# Patient Record
Sex: FEMALE | Race: WHITE | NOT HISPANIC OR LATINO | Employment: FULL TIME | RURAL
[De-identification: names, ages, dates, MRNs, and addresses within clinical notes are randomized per-mention and may not be internally consistent; named-entity substitution may affect disease eponyms.]

---

## 2021-08-03 ENCOUNTER — HOSPITAL ENCOUNTER (EMERGENCY)
Facility: HOSPITAL | Age: 61
Discharge: HOME | End: 2021-08-03
Payer: OTHER MISCELLANEOUS

## 2021-08-03 VITALS
BODY MASS INDEX: 25.69 KG/M2 | OXYGEN SATURATION: 98 % | TEMPERATURE: 98 F | SYSTOLIC BLOOD PRESSURE: 154 MMHG | WEIGHT: 145 LBS | HEART RATE: 69 BPM | DIASTOLIC BLOOD PRESSURE: 75 MMHG | RESPIRATION RATE: 18 BRPM | HEIGHT: 63 IN

## 2021-08-03 DIAGNOSIS — S60.221A CONTUSION OF RIGHT HAND, INITIAL ENCOUNTER: Primary | ICD-10-CM

## 2021-08-03 DIAGNOSIS — S69.91XA HAND INJURY, RIGHT, INITIAL ENCOUNTER: ICD-10-CM

## 2021-08-03 PROCEDURE — 99282 PR EMERGENCY DEPT VISIT,LEVEL II: ICD-10-PCS | Mod: ,,, | Performed by: NURSE PRACTITIONER

## 2021-08-03 PROCEDURE — 99282 EMERGENCY DEPT VISIT SF MDM: CPT | Mod: ,,, | Performed by: NURSE PRACTITIONER

## 2021-08-03 PROCEDURE — 99283 EMERGENCY DEPT VISIT LOW MDM: CPT

## 2021-08-03 RX ORDER — ASPIRIN 81 MG/1
81 TABLET ORAL DAILY
COMMUNITY
End: 2023-06-06

## 2021-08-03 RX ORDER — LANSOPRAZOLE 30 MG/1
30 CAPSULE, DELAYED RELEASE ORAL DAILY
COMMUNITY

## 2021-08-03 RX ORDER — GLIMEPIRIDE 2 MG/1
2 TABLET ORAL
COMMUNITY
End: 2023-10-27 | Stop reason: CLARIF

## 2021-08-03 RX ORDER — METFORMIN HYDROCHLORIDE 500 MG/1
500 TABLET ORAL 2 TIMES DAILY WITH MEALS
COMMUNITY
End: 2023-10-27 | Stop reason: CLARIF

## 2021-08-03 RX ORDER — GABAPENTIN 800 MG/1
900 TABLET ORAL 4 TIMES DAILY
COMMUNITY
End: 2022-08-31

## 2021-08-03 RX ORDER — ATORVASTATIN CALCIUM 40 MG/1
80 TABLET, FILM COATED ORAL DAILY
COMMUNITY

## 2021-08-03 RX ORDER — CLOPIDOGREL BISULFATE 75 MG/1
75 TABLET ORAL DAILY
COMMUNITY
End: 2022-08-31 | Stop reason: SDUPTHER

## 2021-08-03 RX ORDER — TRAMADOL HYDROCHLORIDE 50 MG/1
50 TABLET ORAL EVERY 8 HOURS PRN
COMMUNITY

## 2021-08-03 RX ORDER — AMLODIPINE BESYLATE 10 MG/1
10 TABLET ORAL DAILY
COMMUNITY

## 2021-08-08 ENCOUNTER — TELEPHONE (OUTPATIENT)
Dept: EMERGENCY MEDICINE | Facility: HOSPITAL | Age: 61
End: 2021-08-08

## 2021-08-12 ENCOUNTER — OFFICE VISIT (OUTPATIENT)
Dept: OTOLARYNGOLOGY | Facility: CLINIC | Age: 61
End: 2021-08-12
Payer: COMMERCIAL

## 2021-08-12 VITALS — HEIGHT: 63 IN | BODY MASS INDEX: 25.69 KG/M2 | WEIGHT: 145 LBS

## 2021-08-12 DIAGNOSIS — H65.23 CHRONIC SEROUS OTITIS MEDIA OF BOTH EARS: Primary | ICD-10-CM

## 2021-08-12 DIAGNOSIS — H69.90 DYSFUNCTION OF EUSTACHIAN TUBE, UNSPECIFIED LATERALITY: ICD-10-CM

## 2021-08-12 PROCEDURE — 99204 PR OFFICE/OUTPT VISIT, NEW, LEVL IV, 45-59 MIN: ICD-10-PCS | Mod: S$PBB,,, | Performed by: OTOLARYNGOLOGY

## 2021-08-12 PROCEDURE — 99204 OFFICE O/P NEW MOD 45 MIN: CPT | Mod: S$PBB,,, | Performed by: OTOLARYNGOLOGY

## 2021-08-12 PROCEDURE — 99213 OFFICE O/P EST LOW 20 MIN: CPT | Mod: PBBFAC | Performed by: OTOLARYNGOLOGY

## 2021-08-12 RX ORDER — AMOXICILLIN AND CLAVULANATE POTASSIUM 875; 125 MG/1; MG/1
1 TABLET, FILM COATED ORAL EVERY 12 HOURS
Qty: 20 TABLET | Refills: 0 | Status: SHIPPED | OUTPATIENT
Start: 2021-08-12 | End: 2021-08-22

## 2021-08-12 RX ORDER — TRIAMCINOLONE ACETONIDE 55 UG/1
2 SPRAY, METERED NASAL DAILY
Qty: 16.9 ML | Refills: 0 | Status: SHIPPED | OUTPATIENT
Start: 2021-08-12 | End: 2023-10-27 | Stop reason: CLARIF

## 2022-08-31 ENCOUNTER — OFFICE VISIT (OUTPATIENT)
Dept: NEUROLOGY | Facility: CLINIC | Age: 62
End: 2022-08-31
Payer: COMMERCIAL

## 2022-08-31 VITALS
RESPIRATION RATE: 18 BRPM | BODY MASS INDEX: 25.16 KG/M2 | HEIGHT: 63 IN | HEART RATE: 75 BPM | SYSTOLIC BLOOD PRESSURE: 138 MMHG | WEIGHT: 142 LBS | DIASTOLIC BLOOD PRESSURE: 70 MMHG | OXYGEN SATURATION: 95 %

## 2022-08-31 DIAGNOSIS — E10.42 DIABETIC POLYNEUROPATHY ASSOCIATED WITH TYPE 1 DIABETES MELLITUS: ICD-10-CM

## 2022-08-31 DIAGNOSIS — I63.9 CEREBROVASCULAR ACCIDENT (CVA), UNSPECIFIED MECHANISM: Primary | ICD-10-CM

## 2022-08-31 PROCEDURE — 99204 OFFICE O/P NEW MOD 45 MIN: CPT | Mod: S$PBB,,, | Performed by: NURSE PRACTITIONER

## 2022-08-31 PROCEDURE — 3078F DIAST BP <80 MM HG: CPT | Mod: ,,, | Performed by: NURSE PRACTITIONER

## 2022-08-31 PROCEDURE — 1160F RVW MEDS BY RX/DR IN RCRD: CPT | Mod: ,,, | Performed by: NURSE PRACTITIONER

## 2022-08-31 PROCEDURE — 1160F PR REVIEW ALL MEDS BY PRESCRIBER/CLIN PHARMACIST DOCUMENTED: ICD-10-PCS | Mod: ,,, | Performed by: NURSE PRACTITIONER

## 2022-08-31 PROCEDURE — 3078F PR MOST RECENT DIASTOLIC BLOOD PRESSURE < 80 MM HG: ICD-10-PCS | Mod: ,,, | Performed by: NURSE PRACTITIONER

## 2022-08-31 PROCEDURE — 99214 OFFICE O/P EST MOD 30 MIN: CPT | Mod: PBBFAC | Performed by: NURSE PRACTITIONER

## 2022-08-31 PROCEDURE — 1159F PR MEDICATION LIST DOCUMENTED IN MEDICAL RECORD: ICD-10-PCS | Mod: ,,, | Performed by: NURSE PRACTITIONER

## 2022-08-31 PROCEDURE — 3008F BODY MASS INDEX DOCD: CPT | Mod: ,,, | Performed by: NURSE PRACTITIONER

## 2022-08-31 PROCEDURE — 3075F SYST BP GE 130 - 139MM HG: CPT | Mod: ,,, | Performed by: NURSE PRACTITIONER

## 2022-08-31 PROCEDURE — 99204 PR OFFICE/OUTPT VISIT, NEW, LEVL IV, 45-59 MIN: ICD-10-PCS | Mod: S$PBB,,, | Performed by: NURSE PRACTITIONER

## 2022-08-31 PROCEDURE — 1159F MED LIST DOCD IN RCRD: CPT | Mod: ,,, | Performed by: NURSE PRACTITIONER

## 2022-08-31 PROCEDURE — 3075F PR MOST RECENT SYSTOLIC BLOOD PRESS GE 130-139MM HG: ICD-10-PCS | Mod: ,,, | Performed by: NURSE PRACTITIONER

## 2022-08-31 PROCEDURE — 3008F PR BODY MASS INDEX (BMI) DOCUMENTED: ICD-10-PCS | Mod: ,,, | Performed by: NURSE PRACTITIONER

## 2022-08-31 RX ORDER — GABAPENTIN 600 MG/1
900 TABLET ORAL 4 TIMES DAILY
Qty: 180 TABLET | Refills: 5 | Status: SHIPPED | OUTPATIENT
Start: 2022-08-31 | End: 2023-10-05 | Stop reason: SDUPTHER

## 2022-08-31 RX ORDER — CLOPIDOGREL BISULFATE 75 MG/1
75 TABLET ORAL DAILY
Qty: 30 TABLET | Refills: 5 | Status: SHIPPED | OUTPATIENT
Start: 2022-08-31 | End: 2023-01-09 | Stop reason: SDUPTHER

## 2022-08-31 NOTE — PROGRESS NOTES
Subjective:       Patient ID: Alina Resendez is a 61 y.o. female     Chief Complaint:    Chief Complaint   Patient presents with    Referral    Stroke        Allergies:  Patient has no known allergies.    Current Medications:    Outpatient Encounter Medications as of 8/31/2022   Medication Sig Dispense Refill    amLODIPine (NORVASC) 10 MG tablet Take 10 mg by mouth once daily.      aspirin (ECOTRIN) 81 MG EC tablet Take 81 mg by mouth once daily.      atorvastatin (LIPITOR) 40 MG tablet Take 80 mg by mouth once daily.      glimepiride (AMARYL) 2 MG tablet Take 2 mg by mouth before breakfast.      insulin detemir U-100 (LEVEMIR) 100 unit/mL injection Inject 70 Units into the skin every evening.       lansoprazole (PREVACID) 30 MG capsule Take 30 mg by mouth once daily.      traMADoL (ULTRAM) 50 mg tablet Take 50 mg by mouth every 8 (eight) hours as needed.       triamcinolone (NASACORT) 55 mcg nasal inhaler 2 sprays by Nasal route once daily. 16.9 mL 0    [DISCONTINUED] clopidogreL (PLAVIX) 75 mg tablet Take 75 mg by mouth once daily.      [DISCONTINUED] gabapentin (NEURONTIN) 800 MG tablet Take 900 mg by mouth 4 (four) times daily.      clopidogreL (PLAVIX) 75 mg tablet Take 1 tablet (75 mg total) by mouth once daily. 30 tablet 5    gabapentin (NEURONTIN) 600 MG tablet Take 1.5 tablets (900 mg total) by mouth 4 (four) times daily. 180 tablet 5    metFORMIN (GLUCOPHAGE) 500 MG tablet Take 500 mg by mouth 2 (two) times daily with meals.       No facility-administered encounter medications on file as of 8/31/2022.       History of Present Illness  62 y/o female new referral to neurology for reported history of CVA, restless leg syndrome, diabetic polyneuropathy    She formerly followed by Dr. Chen at [facility]    Records indicate CVA was in May of 2021 with reported right hemiparesis and mild speech delay.  Also reported is continued on plavix 75mg and ASA 81mg daily.  She reports prior TIA of at least 2, and  "2 CVAs possibly.  EMG/NCS of the BLE done by Dr. Chen in March of 2022 did not report a radiculopathy nor a polyneuropathy.  MRI 4/2022 copy sent with records shows bilateral hemisphere old lacunar infarcts.  She says the last CVA in May of 2021 caused her right hemiparesis which has improved, she has slight weakness on the RUE and RLE, but she has more difficulty with her speech, at times her speech will "run together" and she has to stop and start over.  I do note this several times during the visit.  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  Has some difficulty with holding items in bilateral hands, has prior history of bilateral carpal tunnel release 7-8 years ago.  She says at the time of her last stroke they had actually held her plavix for 5 days so she could have an upper GI scope and this is when the CVA happened, so not plavix failure.  They kept her on ASA and plavix since that time.  I do recommend she can stop the aspirin, she does not need to be on duel therapy.  She continues on lipitor and norvasc.  She declines PT/ST, says she is too busy to be able to do it, but if she decides to do so can schedule this at [place].    Diabetic polyneuropathy, prescribed neurontin 900mg QID, so maximum dosing.  Has been on this dosing for few years, denies too much complication and says symptoms of burning in her feet is much worse if she misses a dose.  IN past 1200mg on Tid dosing made her too sleepy, thus the 900mg Qid.    Prior neuropsyche workup for reported memory impairment resulted primarily changes from prior CVA, but also likely contributing is the high dose neurontin         Review of Systems  Review of Systems   Constitutional:  Negative for diaphoresis and fever.   HENT:  Negative for congestion, hearing loss and tinnitus.    Eyes:  Negative for blurred vision, double vision, photophobia, discharge and redness.   Respiratory:  Negative for cough and shortness of breath.    Cardiovascular:  Negative " for chest pain.   Gastrointestinal:  Negative for abdominal pain, nausea and vomiting.   Musculoskeletal:  Negative for back pain, falls, joint pain, myalgias and neck pain.   Skin:  Negative for itching and rash.   Neurological:  Positive for tingling, sensory change, speech change and weakness. Negative for dizziness, tremors, focal weakness, seizures, loss of consciousness and headaches.   Psychiatric/Behavioral:  Positive for memory loss. Negative for depression and hallucinations. The patient does not have insomnia.    All other systems reviewed and are negative.   Objective:     NEUROLOGICAL EXAMINATION:     MENTAL STATUS   Oriented to person, place, and time.   Registration: recalls 3 of 3 objects. Recall at 5 minutes: recalls 3 of 3 objects.   Attention: normal. Concentration: normal.   Speech: speech is normal   Level of consciousness: alert  Knowledge: good and consistent with education.   Normal comprehension.     CRANIAL NERVES     CN II   Visual fields full to confrontation.   Visual acuity: normal  Right visual field deficit: none  Left visual field deficit: none     CN III, IV, VI   Pupils are equal, round, and reactive to light.  Extraocular motions are normal.   Right pupil: Size: 3 mm. Shape: regular. Reactivity: brisk. Consensual response: intact. Accommodation: intact.   Left pupil: Size: 3 mm. Shape: regular. Reactivity: brisk. Consensual response: intact. Accommodation: intact.   CN III: no CN III palsy  CN VI: no CN VI palsy  Nystagmus: none   Diplopia: none  Upgaze: normal  Downgaze: normal  Conjugate gaze: present  Vestibulo-ocular reflex: present    CN V   Facial sensation intact.   Right facial sensation deficit: none  Left facial sensation deficit: none  Right corneal reflex: normal  Left corneal reflex: normal    CN VII   Facial expression full, symmetric.   Right facial weakness: none  Left facial weakness: none  Right taste: normal  Left taste: normal    CN VIII   CN VIII normal.    Hearing: intact    CN IX, X   CN IX normal.   CN X normal.   Palate: symmetric    CN XI   CN XI normal.   Right sternocleidomastoid strength: normal  Left sternocleidomastoid strength: normal  Right trapezius strength: normal  Left trapezius strength: normal    CN XII   CN XII normal.   Tongue: not atrophic  Fasciculations: absent  Tongue deviation: none    MOTOR EXAM   Muscle bulk: normal  Overall muscle tone: normal  Right arm tone: normal  Left arm tone: normal  Right arm pronator drift: absent  Left arm pronator drift: absent  Right leg tone: normal  Left leg tone: normal    Strength   Right neck flexion: 5/5  Left neck flexion: 5/5  Right neck extension: 5/5  Left neck extension: 5/5  Right deltoid: 5/5  Left deltoid: 5/5  Right biceps: 4/5  Left biceps: 5/5  Right triceps: 4/5  Left triceps: 5/5  Right wrist flexion: 5/5  Left wrist flexion: 5/5  Right wrist extension: 4/5  Left wrist extension: 5/5  Right interossei: 4/5  Left interossei: 5/5  Right iliopsoas: 5/5  Left iliopsoas: 5/5  Right quadriceps: 5/5  Left quadriceps: 5/5  Right hamstrin/5  Left hamstrin/5  Right anterior tibial: 5/5  Left anterior tibial: 5/5  Right posterior tibial: 5/5  Left posterior tibial: 5/5  Right gastroc: 5/5  Left gastroc: 5/5    REFLEXES     Reflexes   Right brachioradialis: 2+  Left brachioradialis: 2+  Right biceps: 2+  Left biceps: 2+  Right triceps: 2+  Left triceps: 2+  Right patellar: 2+  Left patellar: 2+  Right achilles: 2+  Left achilles: 2+  Right plantar: normal  Left plantar: normal  Right Jerry: absent  Left Jerry: absent  Right ankle clonus: absent  Left ankle clonus: absent  Right pendular knee jerk: absent  Left pendular knee jerk: absent    SENSORY EXAM   Right arm light touch: decreased from fingers  Left arm light touch: decreased from fingers  Right leg light touch: normal  Left leg light touch: normal  Right arm vibration: decreased from fingers  Left arm vibration: decreased from  fingers  Right leg vibration: normal  Left leg vibration: normal  Right arm proprioception: decreased from fingers  Left arm proprioception: decreased from fingers  Right leg proprioception: normal  Left leg proprioception: normal  Right arm pinprick: decreased from fingers  Left arm pinprick: decreased from fingers  Right leg pinprick: normal  Left leg pinprick: normal    GAIT AND COORDINATION     Gait  Gait: wide-based     Coordination   Finger to nose coordination: normal  Heel to shin coordination: normal  Tandem walking coordination: abnormal    Tremor   Resting tremor: absent  Intention tremor: absent  Action tremor: absent     Physical Exam  Vitals and nursing note reviewed.   Constitutional:       Appearance: Normal appearance.   HENT:      Head: Normocephalic.   Eyes:      Extraocular Movements: Extraocular movements intact and EOM normal.      Pupils: Pupils are equal, round, and reactive to light.   Cardiovascular:      Rate and Rhythm: Normal rate and regular rhythm.   Pulmonary:      Effort: Pulmonary effort is normal.      Breath sounds: Normal breath sounds.   Musculoskeletal:         General: No swelling or tenderness. Normal range of motion.      Cervical back: Normal range of motion and neck supple.      Right lower leg: No edema.      Left lower leg: No edema.   Skin:     General: Skin is warm and dry.      Coloration: Skin is not jaundiced.      Findings: No rash.   Neurological:      General: No focal deficit present.      Mental Status: She is alert and oriented to person, place, and time. Mental status is at baseline.      GCS: GCS eye subscore is 4. GCS verbal subscore is 5. GCS motor subscore is 6.      Cranial Nerves: No cranial nerve deficit.      Sensory: Sensory deficit present.      Motor: Weakness present.      Coordination: Coordination is intact. Coordination normal. Finger-Nose-Finger Test and Heel to Shin Test normal.      Gait: Tandem walk abnormal. Gait normal.      Deep Tendon  Reflexes: Reflexes normal.      Reflex Scores:       Tricep reflexes are 2+ on the right side and 2+ on the left side.       Bicep reflexes are 2+ on the right side and 2+ on the left side.       Brachioradialis reflexes are 2+ on the right side and 2+ on the left side.       Patellar reflexes are 2+ on the right side and 2+ on the left side.       Achilles reflexes are 2+ on the right side and 2+ on the left side.  Psychiatric:         Mood and Affect: Mood normal.         Speech: Speech normal.         Behavior: Behavior normal.        Assessment:     Problem List Items Addressed This Visit          Neuro    Cerebrovascular accident (CVA) - Primary    Relevant Medications    clopidogreL (PLAVIX) 75 mg tablet    Diabetic polyneuropathy associated with type 1 diabetes mellitus    Relevant Medications    gabapentin (NEURONTIN) 600 MG tablet        Primary Diagnosis and ICD10  Cerebrovascular accident (CVA), unspecified mechanism [I63.9]    Plan:     Patient Instructions   Can do physical therapy at any time patient feels she needs  Stop the aspirin daily  Continue the plavix 75mg daily  Continue the lipitor and blood pressure medication  Encouraged good eating and sleep habits  Good glycemic control  Continue the neurontin 900mg Qid    Medications Discontinued During This Encounter   Medication Reason    gabapentin (NEURONTIN) 800 MG tablet     clopidogreL (PLAVIX) 75 mg tablet Reorder       Requested Prescriptions     Signed Prescriptions Disp Refills    clopidogreL (PLAVIX) 75 mg tablet 30 tablet 5     Sig: Take 1 tablet (75 mg total) by mouth once daily.    gabapentin (NEURONTIN) 600 MG tablet 180 tablet 5     Sig: Take 1.5 tablets (900 mg total) by mouth 4 (four) times daily.       No orders of the defined types were placed in this encounter.

## 2022-08-31 NOTE — PATIENT INSTRUCTIONS
Can do physical therapy at any time patient feels she needs  Stop the aspirin daily  Continue the plavix 75mg daily  Continue the lipitor and blood pressure medication  Encouraged good eating and sleep habits  Good glycemic control  Continue the neurontin 900mg Qid

## 2022-12-05 PROBLEM — I63.9 CEREBROVASCULAR ACCIDENT (CVA): Status: RESOLVED | Noted: 2022-08-31 | Resolved: 2022-12-05

## 2023-01-09 DIAGNOSIS — I63.9 CEREBROVASCULAR ACCIDENT (CVA), UNSPECIFIED MECHANISM: ICD-10-CM

## 2023-01-09 RX ORDER — CLOPIDOGREL BISULFATE 75 MG/1
75 TABLET ORAL DAILY
Qty: 30 TABLET | Refills: 5 | Status: SHIPPED | OUTPATIENT
Start: 2023-01-09 | End: 2023-07-06 | Stop reason: SDUPTHER

## 2023-02-28 ENCOUNTER — OFFICE VISIT (OUTPATIENT)
Dept: NEUROLOGY | Facility: CLINIC | Age: 63
End: 2023-02-28
Payer: COMMERCIAL

## 2023-02-28 VITALS
HEART RATE: 78 BPM | OXYGEN SATURATION: 99 % | DIASTOLIC BLOOD PRESSURE: 80 MMHG | HEIGHT: 63 IN | RESPIRATION RATE: 18 BRPM | BODY MASS INDEX: 25.16 KG/M2 | SYSTOLIC BLOOD PRESSURE: 134 MMHG | WEIGHT: 142 LBS

## 2023-02-28 DIAGNOSIS — G31.84 MCI (MILD COGNITIVE IMPAIRMENT): ICD-10-CM

## 2023-02-28 DIAGNOSIS — G56.03 CARPAL TUNNEL SYNDROME ON BOTH SIDES: ICD-10-CM

## 2023-02-28 DIAGNOSIS — I63.9 CEREBROVASCULAR ACCIDENT (CVA), UNSPECIFIED MECHANISM: ICD-10-CM

## 2023-02-28 DIAGNOSIS — I69.398 DEPRESSION AS LATE EFFECT OF CEREBROVASCULAR ACCIDENT (CVA): ICD-10-CM

## 2023-02-28 DIAGNOSIS — E10.42 DIABETIC POLYNEUROPATHY ASSOCIATED WITH TYPE 1 DIABETES MELLITUS: Primary | ICD-10-CM

## 2023-02-28 DIAGNOSIS — F06.31 DEPRESSION AS LATE EFFECT OF CEREBROVASCULAR ACCIDENT (CVA): ICD-10-CM

## 2023-02-28 PROCEDURE — 1159F PR MEDICATION LIST DOCUMENTED IN MEDICAL RECORD: ICD-10-PCS | Mod: ,,, | Performed by: NURSE PRACTITIONER

## 2023-02-28 PROCEDURE — 1160F RVW MEDS BY RX/DR IN RCRD: CPT | Mod: ,,, | Performed by: NURSE PRACTITIONER

## 2023-02-28 PROCEDURE — 99213 PR OFFICE/OUTPT VISIT, EST, LEVL III, 20-29 MIN: ICD-10-PCS | Mod: S$PBB,,, | Performed by: NURSE PRACTITIONER

## 2023-02-28 PROCEDURE — 99213 OFFICE O/P EST LOW 20 MIN: CPT | Mod: S$PBB,,, | Performed by: NURSE PRACTITIONER

## 2023-02-28 PROCEDURE — 3079F DIAST BP 80-89 MM HG: CPT | Mod: ,,, | Performed by: NURSE PRACTITIONER

## 2023-02-28 PROCEDURE — 3008F BODY MASS INDEX DOCD: CPT | Mod: ,,, | Performed by: NURSE PRACTITIONER

## 2023-02-28 PROCEDURE — 1159F MED LIST DOCD IN RCRD: CPT | Mod: ,,, | Performed by: NURSE PRACTITIONER

## 2023-02-28 PROCEDURE — 3079F PR MOST RECENT DIASTOLIC BLOOD PRESSURE 80-89 MM HG: ICD-10-PCS | Mod: ,,, | Performed by: NURSE PRACTITIONER

## 2023-02-28 PROCEDURE — 1160F PR REVIEW ALL MEDS BY PRESCRIBER/CLIN PHARMACIST DOCUMENTED: ICD-10-PCS | Mod: ,,, | Performed by: NURSE PRACTITIONER

## 2023-02-28 PROCEDURE — 99214 OFFICE O/P EST MOD 30 MIN: CPT | Mod: PBBFAC | Performed by: NURSE PRACTITIONER

## 2023-02-28 PROCEDURE — 3075F PR MOST RECENT SYSTOLIC BLOOD PRESS GE 130-139MM HG: ICD-10-PCS | Mod: ,,, | Performed by: NURSE PRACTITIONER

## 2023-02-28 PROCEDURE — 3075F SYST BP GE 130 - 139MM HG: CPT | Mod: ,,, | Performed by: NURSE PRACTITIONER

## 2023-02-28 PROCEDURE — 3008F PR BODY MASS INDEX (BMI) DOCUMENTED: ICD-10-PCS | Mod: ,,, | Performed by: NURSE PRACTITIONER

## 2023-02-28 RX ORDER — CITALOPRAM 10 MG/1
10 TABLET ORAL DAILY
Qty: 30 TABLET | Refills: 11 | Status: SHIPPED | OUTPATIENT
Start: 2023-02-28 | End: 2024-03-12 | Stop reason: SDUPTHER

## 2023-02-28 NOTE — PROGRESS NOTES
Subjective:       Patient ID: Alina Resendez is a 62 y.o. female     Chief Complaint:    Chief Complaint   Patient presents with    Follow-up    Stroke        Allergies:  Patient has no known allergies.    Current Medications:    Outpatient Encounter Medications as of 2/28/2023   Medication Sig Dispense Refill    amLODIPine (NORVASC) 10 MG tablet Take 10 mg by mouth once daily.      atorvastatin (LIPITOR) 40 MG tablet Take 80 mg by mouth once daily.      clopidogreL (PLAVIX) 75 mg tablet Take 1 tablet (75 mg total) by mouth once daily. 30 tablet 5    gabapentin (NEURONTIN) 600 MG tablet Take 1.5 tablets (900 mg total) by mouth 4 (four) times daily. 180 tablet 5    insulin detemir U-100 (LEVEMIR) 100 unit/mL injection Inject 70 Units into the skin every evening.       lansoprazole (PREVACID) 30 MG capsule Take 30 mg by mouth once daily.      traMADoL (ULTRAM) 50 mg tablet Take 50 mg by mouth every 8 (eight) hours as needed.       triamcinolone (NASACORT) 55 mcg nasal inhaler 2 sprays by Nasal route once daily. 16.9 mL 0    aspirin (ECOTRIN) 81 MG EC tablet Take 81 mg by mouth once daily.      citalopram (CELEXA) 10 MG tablet Take 1 tablet (10 mg total) by mouth once daily. 30 tablet 11    glimepiride (AMARYL) 2 MG tablet Take 2 mg by mouth before breakfast.      metFORMIN (GLUCOPHAGE) 500 MG tablet Take 500 mg by mouth 2 (two) times daily with meals.       No facility-administered encounter medications on file as of 2/28/2023.       History of Present Illness  63 y/o female following in neurology for reported history of CVA, restless leg syndrome, diabetic polyneuropathy    She formerly followed by Dr. Chen at [facility]    Records indicate CVA was in May of 2021 with reported right hemiparesis and mild speech delay.  Also reported is continued on plavix 75mg and ASA 81mg daily.  She reports prior TIA of at least 2, and 2 CVAs possibly.  She says at the time of her last stroke they had actually held her  "plavix for 5 days so she could have an upper GI scope and this is when the CVA happened, so not plavix failure.  They kept her on ASA and plavix since that time.  I did recommend she can stop the aspirin at last visit, she does not need to be on duel therapy.  She continues on lipitor and norvasc.  She declines PT/ST, says she is too busy to be able to do it, but if she decides to do so can schedule this at [place].    She has history of of carpal tunnel with bilateral release 10 years prior with continued bilateral hand weakness    EMG/NCS of the BLE done by Dr. Chen in March of 2022 did not report a radiculopathy nor a polyneuropathy.  MRI 4/2022 copy sent with records shows bilateral hemisphere old lacunar infarcts.  She says the last CVA in May of 2021 caused her right hemiparesis which has improved, she has slight weakness on the RUE and RLE, but she has more difficulty with her speech, at times her speech will "run together" and she has to stop and start over.  I do note this several times during the visit.  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  Has some difficulty with holding items in bilateral hands, has prior history of bilateral carpal tunnel release 7-8 years ago.      Diabetic polyneuropathy, prescribed neurontin 900mg QID, so maximum dosing.  Has been on this dosing for few years, denies too much complication and says symptoms of burning in her feet is much worse if she misses a dose.  IN past 1200mg on Tid dosing made her too sleepy, thus the 900mg Qid.    Prior neuropsyche workup for reported memory impairment resulted primarily changes from prior CVA, but also likely contributing is the high dose neurontin    She reports increased agitation at times, since the CVA.  I discussed option of celexa and she is open to trying this.         Review of Systems  Review of Systems   Constitutional:  Negative for diaphoresis and fever.   HENT:  Negative for congestion, hearing loss and tinnitus.  "   Eyes:  Negative for blurred vision, double vision, photophobia, discharge and redness.   Respiratory:  Negative for cough and shortness of breath.    Cardiovascular:  Negative for chest pain.   Gastrointestinal:  Negative for abdominal pain, nausea and vomiting.   Musculoskeletal:  Negative for back pain, falls, joint pain, myalgias and neck pain.   Skin:  Negative for itching and rash.   Neurological:  Positive for tingling, sensory change, speech change and weakness. Negative for dizziness, tremors, focal weakness, seizures, loss of consciousness and headaches.   Psychiatric/Behavioral:  Positive for depression and memory loss. Negative for hallucinations. The patient does not have insomnia.    All other systems reviewed and are negative.   Objective:     NEUROLOGICAL EXAMINATION:     MENTAL STATUS   Oriented to person, place, and time.   Registration: recalls 3 of 3 objects. Recall at 5 minutes: recalls 3 of 3 objects.   Attention: normal. Concentration: normal.   Speech: speech is normal   Level of consciousness: alert  Knowledge: good and consistent with education.   Normal comprehension.     CRANIAL NERVES     CN II   Visual fields full to confrontation.   Visual acuity: normal  Right visual field deficit: none  Left visual field deficit: none     CN III, IV, VI   Pupils are equal, round, and reactive to light.  Extraocular motions are normal.   Right pupil: Size: 3 mm. Shape: regular. Reactivity: brisk. Consensual response: intact. Accommodation: intact.   Left pupil: Size: 3 mm. Shape: regular. Reactivity: brisk. Consensual response: intact. Accommodation: intact.   CN III: no CN III palsy  CN VI: no CN VI palsy  Nystagmus: none   Diplopia: none  Upgaze: normal  Downgaze: normal  Conjugate gaze: present  Vestibulo-ocular reflex: present    CN V   Facial sensation intact.   Right facial sensation deficit: none  Left facial sensation deficit: none  Right corneal reflex: normal  Left corneal reflex:  normal    CN VII   Facial expression full, symmetric.   Right facial weakness: none  Left facial weakness: none  Right taste: normal  Left taste: normal    CN VIII   CN VIII normal.   Hearing: intact    CN IX, X   CN IX normal.   CN X normal.   Palate: symmetric    CN XI   CN XI normal.   Right sternocleidomastoid strength: normal  Left sternocleidomastoid strength: normal  Right trapezius strength: normal  Left trapezius strength: normal    CN XII   CN XII normal.   Tongue: not atrophic  Fasciculations: absent  Tongue deviation: none    MOTOR EXAM   Muscle bulk: normal  Overall muscle tone: normal  Right arm tone: normal  Left arm tone: normal  Right arm pronator drift: absent  Left arm pronator drift: absent  Right leg tone: normal  Left leg tone: normal    Strength   Right neck flexion: 5/5  Left neck flexion: 5/5  Right neck extension: 5/5  Left neck extension: 5/5  Right deltoid: 5/5  Left deltoid: 5/5  Right biceps: 4/5  Left biceps: 5/5  Right triceps: 4/5  Left triceps: 5/5  Right wrist flexion: 5/5  Left wrist flexion: 5/5  Right wrist extension: 4/5  Left wrist extension: 5/5  Right interossei: 4/5  Left interossei: 5/5  Right iliopsoas: 5/5  Left iliopsoas: 5/5  Right quadriceps: 5/5  Left quadriceps: 5/5  Right hamstrin/5  Left hamstrin/5  Right anterior tibial: 5/5  Left anterior tibial: 5/5  Right posterior tibial: 5/5  Left posterior tibial: 5/5  Right gastroc: 5/5  Left gastroc: 5/5    REFLEXES     Reflexes   Right brachioradialis: 2+  Left brachioradialis: 2+  Right biceps: 2+  Left biceps: 2+  Right triceps: 2+  Left triceps: 2+  Right patellar: 2+  Left patellar: 2+  Right achilles: 2+  Left achilles: 2+  Right plantar: normal  Left plantar: normal  Right Jerry: absent  Left Jerry: absent  Right ankle clonus: absent  Left ankle clonus: absent  Right pendular knee jerk: absent  Left pendular knee jerk: absent    SENSORY EXAM   Right arm light touch: decreased from fingers  Left arm  light touch: decreased from fingers  Right leg light touch: normal  Left leg light touch: normal  Right arm vibration: decreased from fingers  Left arm vibration: decreased from fingers  Right leg vibration: normal  Left leg vibration: normal  Right arm proprioception: decreased from fingers  Left arm proprioception: decreased from fingers  Right leg proprioception: normal  Left leg proprioception: normal  Right arm pinprick: decreased from fingers  Left arm pinprick: decreased from fingers  Right leg pinprick: normal  Left leg pinprick: normal  Graphesthesia: normal  Romberg: negative  Stereognosis: normal    GAIT AND COORDINATION     Gait  Gait: wide-based     Coordination   Finger to nose coordination: normal  Heel to shin coordination: normal  Tandem walking coordination: abnormal    Tremor   Resting tremor: absent  Intention tremor: absent  Action tremor: absent     Physical Exam  Vitals and nursing note reviewed.   Constitutional:       Appearance: Normal appearance.   HENT:      Head: Normocephalic.   Eyes:      Extraocular Movements: Extraocular movements intact and EOM normal.      Pupils: Pupils are equal, round, and reactive to light.   Cardiovascular:      Rate and Rhythm: Normal rate and regular rhythm.   Pulmonary:      Effort: Pulmonary effort is normal.      Breath sounds: Normal breath sounds.   Musculoskeletal:         General: No swelling or tenderness. Normal range of motion.      Cervical back: Normal range of motion and neck supple.      Right lower leg: No edema.      Left lower leg: No edema.   Skin:     General: Skin is warm and dry.      Coloration: Skin is not jaundiced.      Findings: No rash.   Neurological:      General: No focal deficit present.      Mental Status: She is alert and oriented to person, place, and time. Mental status is at baseline.      GCS: GCS eye subscore is 4. GCS verbal subscore is 5. GCS motor subscore is 6.      Cranial Nerves: No cranial nerve deficit.       Sensory: Sensory deficit present.      Motor: Weakness present.      Coordination: Coordination is intact. Coordination normal. Finger-Nose-Finger Test, Heel to Shin Test and Romberg Test normal.      Gait: Tandem walk abnormal. Gait normal.      Deep Tendon Reflexes: Reflexes normal.      Reflex Scores:       Tricep reflexes are 2+ on the right side and 2+ on the left side.       Bicep reflexes are 2+ on the right side and 2+ on the left side.       Brachioradialis reflexes are 2+ on the right side and 2+ on the left side.       Patellar reflexes are 2+ on the right side and 2+ on the left side.       Achilles reflexes are 2+ on the right side and 2+ on the left side.  Psychiatric:         Mood and Affect: Mood normal.         Speech: Speech normal.         Behavior: Behavior normal.        Assessment:     Problem List Items Addressed This Visit          Neuro    Cerebrovascular accident (CVA)    Diabetic polyneuropathy associated with type 1 diabetes mellitus - Primary    MCI (mild cognitive impairment)    Carpal tunnel syndrome on both sides       Psychiatric    Depression as late effect of cerebrovascular accident (CVA)    Relevant Medications    citalopram (CELEXA) 10 MG tablet          Primary Diagnosis and ICD10  Diabetic polyneuropathy associated with type 1 diabetes mellitus [E10.42]    Plan:     Patient Instructions   Can do physical therapy at any time patient feels she needs  Start celexa 10mg daily  Continue the plavix 75mg daily  Continue the lipitor and blood pressure medication  Encouraged good eating and sleep habits  Good glycemic control  Continue the neurontin 900mg Qid  Wear wrist splints at night to help with carpal tunnel symptoms    There are no discontinued medications.      Requested Prescriptions     Signed Prescriptions Disp Refills    citalopram (CELEXA) 10 MG tablet 30 tablet 11     Sig: Take 1 tablet (10 mg total) by mouth once daily.       No orders of the defined types were placed in  this encounter.

## 2023-02-28 NOTE — PATIENT INSTRUCTIONS
Can do physical therapy at any time patient feels she needs  Start celexa 10mg daily  Continue the plavix 75mg daily  Continue the lipitor and blood pressure medication  Encouraged good eating and sleep habits  Good glycemic control  Continue the neurontin 900mg Qid  Wear wrist splints at night to help with carpal tunnel symptoms

## 2023-04-28 ENCOUNTER — OFFICE VISIT (OUTPATIENT)
Dept: NEUROLOGY | Facility: CLINIC | Age: 63
End: 2023-04-28
Payer: COMMERCIAL

## 2023-04-28 VITALS
SYSTOLIC BLOOD PRESSURE: 138 MMHG | HEART RATE: 69 BPM | DIASTOLIC BLOOD PRESSURE: 74 MMHG | BODY MASS INDEX: 25.34 KG/M2 | HEIGHT: 63 IN | OXYGEN SATURATION: 98 % | WEIGHT: 143 LBS

## 2023-04-28 DIAGNOSIS — G31.84 MCI (MILD COGNITIVE IMPAIRMENT): Primary | ICD-10-CM

## 2023-04-28 DIAGNOSIS — E10.42 DIABETIC POLYNEUROPATHY ASSOCIATED WITH TYPE 1 DIABETES MELLITUS: ICD-10-CM

## 2023-04-28 DIAGNOSIS — I63.9 CEREBROVASCULAR ACCIDENT (CVA), UNSPECIFIED MECHANISM: ICD-10-CM

## 2023-04-28 DIAGNOSIS — G56.03 CARPAL TUNNEL SYNDROME ON BOTH SIDES: ICD-10-CM

## 2023-04-28 DIAGNOSIS — E53.8 VITAMIN B12 DEFICIENCY: ICD-10-CM

## 2023-04-28 DIAGNOSIS — R30.0 DYSURIA: ICD-10-CM

## 2023-04-28 DIAGNOSIS — E03.9 HYPOTHYROIDISM, UNSPECIFIED TYPE: ICD-10-CM

## 2023-04-28 DIAGNOSIS — E55.9 VITAMIN D DEFICIENCY: ICD-10-CM

## 2023-04-28 DIAGNOSIS — I63.89 OTHER CEREBRAL INFARCTION: ICD-10-CM

## 2023-04-28 LAB
BILIRUB UR QL STRIP: NEGATIVE
CAOX CRY URNS QL MICRO: ABNORMAL /HPF
CLARITY UR: CLEAR
COLOR UR: YELLOW
GLUCOSE UR STRIP-MCNC: 70 MG/DL
KETONES UR STRIP-SCNC: NEGATIVE MG/DL
LEUKOCYTE ESTERASE UR QL STRIP: ABNORMAL
MUCOUS, UA: ABNORMAL /LPF
NITRITE UR QL STRIP: NEGATIVE
PH UR STRIP: 6 PH UNITS
PROT UR QL STRIP: 10
RBC # UR STRIP: NEGATIVE /UL
RBC #/AREA URNS HPF: 1 /HPF
SP GR UR STRIP: 1.02
SQUAMOUS #/AREA URNS LPF: ABNORMAL /HPF
UROBILINOGEN UR STRIP-ACNC: NORMAL MG/DL
WBC #/AREA URNS HPF: 3 /HPF

## 2023-04-28 PROCEDURE — 3078F PR MOST RECENT DIASTOLIC BLOOD PRESSURE < 80 MM HG: ICD-10-PCS | Mod: ,,, | Performed by: NURSE PRACTITIONER

## 2023-04-28 PROCEDURE — 3008F PR BODY MASS INDEX (BMI) DOCUMENTED: ICD-10-PCS | Mod: ,,, | Performed by: NURSE PRACTITIONER

## 2023-04-28 PROCEDURE — 1160F PR REVIEW ALL MEDS BY PRESCRIBER/CLIN PHARMACIST DOCUMENTED: ICD-10-PCS | Mod: ,,, | Performed by: NURSE PRACTITIONER

## 2023-04-28 PROCEDURE — 3075F PR MOST RECENT SYSTOLIC BLOOD PRESS GE 130-139MM HG: ICD-10-PCS | Mod: ,,, | Performed by: NURSE PRACTITIONER

## 2023-04-28 PROCEDURE — 3075F SYST BP GE 130 - 139MM HG: CPT | Mod: ,,, | Performed by: NURSE PRACTITIONER

## 2023-04-28 PROCEDURE — 3008F BODY MASS INDEX DOCD: CPT | Mod: ,,, | Performed by: NURSE PRACTITIONER

## 2023-04-28 PROCEDURE — 99214 OFFICE O/P EST MOD 30 MIN: CPT | Mod: PBBFAC | Performed by: NURSE PRACTITIONER

## 2023-04-28 PROCEDURE — 99213 OFFICE O/P EST LOW 20 MIN: CPT | Mod: S$PBB,,, | Performed by: NURSE PRACTITIONER

## 2023-04-28 PROCEDURE — 1159F PR MEDICATION LIST DOCUMENTED IN MEDICAL RECORD: ICD-10-PCS | Mod: ,,, | Performed by: NURSE PRACTITIONER

## 2023-04-28 PROCEDURE — 81001 URINALYSIS AUTO W/SCOPE: CPT | Mod: ,,, | Performed by: CLINICAL MEDICAL LABORATORY

## 2023-04-28 PROCEDURE — 1159F MED LIST DOCD IN RCRD: CPT | Mod: ,,, | Performed by: NURSE PRACTITIONER

## 2023-04-28 PROCEDURE — 81001 URINALYSIS, REFLEX TO URINE CULTURE: ICD-10-PCS | Mod: ,,, | Performed by: CLINICAL MEDICAL LABORATORY

## 2023-04-28 PROCEDURE — 99213 PR OFFICE/OUTPT VISIT, EST, LEVL III, 20-29 MIN: ICD-10-PCS | Mod: S$PBB,,, | Performed by: NURSE PRACTITIONER

## 2023-04-28 PROCEDURE — 1160F RVW MEDS BY RX/DR IN RCRD: CPT | Mod: ,,, | Performed by: NURSE PRACTITIONER

## 2023-04-28 PROCEDURE — 3078F DIAST BP <80 MM HG: CPT | Mod: ,,, | Performed by: NURSE PRACTITIONER

## 2023-04-28 NOTE — PROGRESS NOTES
Subjective:       Patient ID: Alina Resendez is a 62 y.o. female     Chief Complaint:    Chief Complaint   Patient presents with    Follow-up        Allergies:  Patient has no known allergies.    Current Medications:    Outpatient Encounter Medications as of 4/28/2023   Medication Sig Dispense Refill    amLODIPine (NORVASC) 10 MG tablet Take 10 mg by mouth once daily.      aspirin (ECOTRIN) 81 MG EC tablet Take 81 mg by mouth once daily.      atorvastatin (LIPITOR) 40 MG tablet Take 80 mg by mouth once daily.      citalopram (CELEXA) 10 MG tablet Take 1 tablet (10 mg total) by mouth once daily. 30 tablet 11    clopidogreL (PLAVIX) 75 mg tablet Take 1 tablet (75 mg total) by mouth once daily. 30 tablet 5    gabapentin (NEURONTIN) 600 MG tablet Take 1.5 tablets (900 mg total) by mouth 4 (four) times daily. 180 tablet 5    glimepiride (AMARYL) 2 MG tablet Take 2 mg by mouth before breakfast.      insulin detemir U-100 (LEVEMIR) 100 unit/mL injection Inject 70 Units into the skin every evening.       lansoprazole (PREVACID) 30 MG capsule Take 30 mg by mouth once daily.      metFORMIN (GLUCOPHAGE) 500 MG tablet Take 500 mg by mouth 2 (two) times daily with meals.      traMADoL (ULTRAM) 50 mg tablet Take 50 mg by mouth every 8 (eight) hours as needed.       triamcinolone (NASACORT) 55 mcg nasal inhaler 2 sprays by Nasal route once daily. 16.9 mL 0     No facility-administered encounter medications on file as of 4/28/2023.       History of Present Illness  63 y/o female following in neurology for reported history of CVA, restless leg syndrome, diabetic polyneuropathy    She formerly followed by Dr. Chen at [facility]    Records indicate CVA was in May of 2021 with reported right hemiparesis and mild speech delay.  Also reported is continued on plavix 75mg and ASA 81mg daily.  She reports prior TIA of at least 2, and 2 CVAs possibly.  She says at the time of her last stroke they had actually held her plavix for 5  "days so she could have an upper GI scope and this is when the CVA happened, so not plavix failure.  They kept her on ASA and plavix since that time.  I did recommend she can stop the aspirin at last visit, she does not need to be on duel therapy.  She continues on lipitor and norvasc.  Today her speech seems more delayed than at last visit, she is reporting difficulty with ambulation, feeling weak and off balance.  This over the last 2 weeks by her report.  She was at [place] 4 weeks ago and had no difficulty in ambulation, in clinic today she seems slowed in ambulation and having to use her arms to maintain balance.  She says she is hurting all over, seems her neuropathy symptoms are worse.  I have concern about her changes in speech and ambulation.  I would like to get MRI brain to ensure no new CVA.  Labs as well including urine.    She has history of of carpal tunnel with bilateral release 10 years prior with continued bilateral hand weakness    EMG/NCS of the BLE done by Dr. Chen in March of 2022 did not report a radiculopathy nor a polyneuropathy.  MRI 4/2022 copy sent with records shows bilateral hemisphere old lacunar infarcts.  She says the last CVA in May of 2021 caused her right hemiparesis which has improved, she has slight weakness on the RUE and RLE, but she has more difficulty with her speech, at times her speech will "run together" and she has to stop and start over.  I do note this several times during the visit.  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  Has some difficulty with holding items in bilateral hands, has prior history of bilateral carpal tunnel release 7-8 years ago.      Diabetic polyneuropathy, prescribed neurontin 900mg QID, so maximum dosing.  Has been on this dosing for few years, denies too much complication and says symptoms of burning in her feet is much worse if she misses a dose.  IN past 1200mg on Tid dosing made her too sleepy, thus the 900mg Qid.    Prior neuropsyche " workup for reported memory impairment resulted primarily changes from prior CVA, but also likely contributing is the high dose neurontin    She reports increased agitation at times, since the CVA, I started her with celexa last visit.         Review of Systems  Review of Systems   Constitutional:  Negative for diaphoresis and fever.   HENT:  Negative for congestion, hearing loss and tinnitus.    Eyes:  Negative for blurred vision, double vision, photophobia, discharge and redness.   Respiratory:  Negative for cough and shortness of breath.    Cardiovascular:  Negative for chest pain.   Gastrointestinal:  Negative for abdominal pain, nausea and vomiting.   Musculoskeletal:  Negative for back pain, falls, joint pain, myalgias and neck pain.   Skin:  Negative for itching and rash.   Neurological:  Positive for tingling, sensory change, speech change and weakness. Negative for dizziness, tremors, focal weakness, seizures, loss of consciousness and headaches.   Psychiatric/Behavioral:  Positive for depression and memory loss. Negative for hallucinations. The patient does not have insomnia.    All other systems reviewed and are negative.   Objective:     NEUROLOGICAL EXAMINATION:     MENTAL STATUS   Oriented to person, place, and time.   Registration: recalls 3 of 3 objects. Recall at 5 minutes: recalls 3 of 3 objects.   Attention: normal. Concentration: normal.   Speech: speech is normal   Level of consciousness: alert  Knowledge: good and consistent with education.   Normal comprehension.     CRANIAL NERVES     CN II   Visual fields full to confrontation.   Visual acuity: normal  Right visual field deficit: none  Left visual field deficit: none     CN III, IV, VI   Pupils are equal, round, and reactive to light.  Extraocular motions are normal.   Right pupil: Size: 3 mm. Shape: regular. Reactivity: brisk. Consensual response: intact. Accommodation: intact.   Left pupil: Size: 3 mm. Shape: regular. Reactivity: brisk.  Consensual response: intact. Accommodation: intact.   CN III: no CN III palsy  CN VI: no CN VI palsy  Nystagmus: none   Diplopia: none  Upgaze: normal  Downgaze: normal  Conjugate gaze: present  Vestibulo-ocular reflex: present    CN V   Facial sensation intact.   Right facial sensation deficit: none  Left facial sensation deficit: none  Right corneal reflex: normal  Left corneal reflex: normal    CN VII   Facial expression full, symmetric.   Right facial weakness: none  Left facial weakness: none  Right taste: normal  Left taste: normal    CN VIII   CN VIII normal.   Hearing: intact    CN IX, X   CN IX normal.   CN X normal.   Palate: symmetric    CN XI   CN XI normal.   Right sternocleidomastoid strength: normal  Left sternocleidomastoid strength: normal  Right trapezius strength: normal  Left trapezius strength: normal    CN XII   CN XII normal.   Tongue: not atrophic  Fasciculations: absent  Tongue deviation: none    MOTOR EXAM   Muscle bulk: normal  Overall muscle tone: normal  Right arm tone: normal  Left arm tone: normal  Right arm pronator drift: absent  Left arm pronator drift: absent  Right leg tone: normal  Left leg tone: normal    Strength   Right neck flexion: 5/5  Left neck flexion: 5/5  Right neck extension: 5/5  Left neck extension: 5/5  Right deltoid: 5/5  Left deltoid: 5/5  Right biceps: 4/5  Left biceps: 5/5  Right triceps: 4/5  Left triceps: 5/5  Right wrist flexion: 5/5  Left wrist flexion: 5/5  Right wrist extension: 4/5  Left wrist extension: 5/5  Right interossei: 4/5  Left interossei: 5/5  Right iliopsoas: 5/5  Left iliopsoas: 5/5  Right quadriceps: 5/5  Left quadriceps: 5/5  Right hamstrin/5  Left hamstrin/5  Right anterior tibial: 5/5  Left anterior tibial: 5/5  Right posterior tibial: 5/5  Left posterior tibial: 5/5  Right gastroc: 5/5  Left gastroc: 5/5    REFLEXES     Reflexes   Right brachioradialis: 2+  Left brachioradialis: 2+  Right biceps: 2+  Left biceps: 2+  Right  triceps: 2+  Left triceps: 2+  Right patellar: 2+  Left patellar: 2+  Right achilles: 2+  Left achilles: 2+  Right plantar: normal  Left plantar: normal  Right Jerry: absent  Left Jerry: absent  Right ankle clonus: absent  Left ankle clonus: absent  Right pendular knee jerk: absent  Left pendular knee jerk: absent    SENSORY EXAM   Right arm light touch: decreased from fingers  Left arm light touch: decreased from fingers  Right leg light touch: normal  Left leg light touch: normal  Right arm vibration: decreased from fingers  Left arm vibration: decreased from fingers  Right leg vibration: normal  Left leg vibration: normal  Right arm proprioception: decreased from fingers  Left arm proprioception: decreased from fingers  Right leg proprioception: normal  Left leg proprioception: normal  Right arm pinprick: decreased from fingers  Left arm pinprick: decreased from fingers  Right leg pinprick: normal  Left leg pinprick: normal  Graphesthesia: normal  Romberg: negative  Stereognosis: normal    GAIT AND COORDINATION     Gait  Gait: wide-based     Coordination   Finger to nose coordination: normal  Heel to shin coordination: normal  Tandem walking coordination: abnormal    Tremor   Resting tremor: absent  Intention tremor: absent  Action tremor: absent     Physical Exam  Vitals and nursing note reviewed.   Constitutional:       Appearance: Normal appearance.   HENT:      Head: Normocephalic.   Eyes:      Extraocular Movements: Extraocular movements intact and EOM normal.      Pupils: Pupils are equal, round, and reactive to light.   Cardiovascular:      Rate and Rhythm: Normal rate and regular rhythm.   Pulmonary:      Effort: Pulmonary effort is normal.      Breath sounds: Normal breath sounds.   Musculoskeletal:         General: No swelling or tenderness. Normal range of motion.      Cervical back: Normal range of motion and neck supple.      Right lower leg: No edema.      Left lower leg: No edema.   Skin:      General: Skin is warm and dry.      Coloration: Skin is not jaundiced.      Findings: No rash.   Neurological:      General: No focal deficit present.      Mental Status: She is alert and oriented to person, place, and time. Mental status is at baseline.      GCS: GCS eye subscore is 4. GCS verbal subscore is 5. GCS motor subscore is 6.      Cranial Nerves: No cranial nerve deficit.      Sensory: Sensory deficit present.      Motor: Weakness present.      Coordination: Coordination is intact. Coordination normal. Finger-Nose-Finger Test, Heel to Shin Test and Romberg Test normal.      Gait: Tandem walk abnormal. Gait normal.      Deep Tendon Reflexes: Reflexes normal.      Reflex Scores:       Tricep reflexes are 2+ on the right side and 2+ on the left side.       Bicep reflexes are 2+ on the right side and 2+ on the left side.       Brachioradialis reflexes are 2+ on the right side and 2+ on the left side.       Patellar reflexes are 2+ on the right side and 2+ on the left side.       Achilles reflexes are 2+ on the right side and 2+ on the left side.  Psychiatric:         Mood and Affect: Mood normal.         Speech: Speech normal.         Behavior: Behavior normal.        Assessment:     Problem List Items Addressed This Visit          Neuro    Cerebrovascular accident (CVA)    Relevant Orders    CBC Auto Differential    Comprehensive Metabolic Panel    Diabetic polyneuropathy associated with type 1 diabetes mellitus    MCI (mild cognitive impairment) - Primary    Carpal tunnel syndrome on both sides     Other Visit Diagnoses       Other cerebral infarction        Relevant Orders    MRI Brain Without Contrast    Vitamin B12 deficiency        Relevant Orders    Folate    Vitamin B12    Hypothyroidism, unspecified type        Relevant Orders    TSH    Vitamin D deficiency        Relevant Orders    Vitamin D    Dysuria        Relevant Orders    Urinalysis, Reflex to Urine Culture                 Primary Diagnosis  and ICD10  MCI (mild cognitive impairment) [G31.84]    Plan:     There are no Patient Instructions on file for this visit.    There are no discontinued medications.      Requested Prescriptions      No prescriptions requested or ordered in this encounter       Orders Placed This Encounter   Procedures    MRI Brain Without Contrast    CBC Auto Differential    Comprehensive Metabolic Panel    Folate    TSH    Vitamin B12    Vitamin D    Urinalysis, Reflex to Urine Culture

## 2023-05-01 ENCOUNTER — TELEPHONE (OUTPATIENT)
Dept: NEUROLOGY | Facility: CLINIC | Age: 63
End: 2023-05-01
Payer: COMMERCIAL

## 2023-05-01 NOTE — TELEPHONE ENCOUNTER
Called pt and gave her the information below from JP Moya NP. She v/u.        ----- Message from SELENE Estevez sent at 5/1/2023  7:57 AM CDT -----  No significant findings on the labs except low vitamin D, I suggest obtain and take over the counter 2000u daily vitamin D

## 2023-05-12 ENCOUNTER — TELEPHONE (OUTPATIENT)
Dept: NEUROLOGY | Facility: CLINIC | Age: 63
End: 2023-05-12
Payer: COMMERCIAL

## 2023-05-12 NOTE — TELEPHONE ENCOUNTER
Called and informed pt that MRI of the brain showed chronic microvascular changes as well as the prior strokes. No new strokes noted. Pt voiced understanding by verbal return.              ----- Message from Sky Silva CMA sent at 5/12/2023  9:16 AM CDT -----    ----- Message -----  From: SELENE Estevez  Sent: 5/12/2023   7:38 AM CDT  To: Sky Silva CMA    Chronic microvascular changes on the MRI brain as well as the prior old strokes, no new strokes are noted.

## 2023-06-05 PROBLEM — I63.9 CEREBROVASCULAR ACCIDENT (CVA): Status: RESOLVED | Noted: 2022-08-31 | Resolved: 2023-06-05

## 2023-06-06 ENCOUNTER — OFFICE VISIT (OUTPATIENT)
Dept: NEUROLOGY | Facility: CLINIC | Age: 63
End: 2023-06-06
Payer: COMMERCIAL

## 2023-06-06 VITALS
HEART RATE: 74 BPM | OXYGEN SATURATION: 98 % | BODY MASS INDEX: 24.45 KG/M2 | DIASTOLIC BLOOD PRESSURE: 70 MMHG | WEIGHT: 138 LBS | HEIGHT: 63 IN | SYSTOLIC BLOOD PRESSURE: 118 MMHG

## 2023-06-06 DIAGNOSIS — F06.31 DEPRESSION AS LATE EFFECT OF CEREBROVASCULAR ACCIDENT (CVA): ICD-10-CM

## 2023-06-06 DIAGNOSIS — G31.84 MCI (MILD COGNITIVE IMPAIRMENT): ICD-10-CM

## 2023-06-06 DIAGNOSIS — I63.9 CEREBROVASCULAR ACCIDENT (CVA), UNSPECIFIED MECHANISM: Primary | ICD-10-CM

## 2023-06-06 DIAGNOSIS — I69.320 COMBINED RECEPTIVE AND EXPRESSIVE APHASIA AS LATE EFFECT OF CEREBROVASCULAR ACCIDENT (CVA): ICD-10-CM

## 2023-06-06 DIAGNOSIS — E10.42 DIABETIC POLYNEUROPATHY ASSOCIATED WITH TYPE 1 DIABETES MELLITUS: ICD-10-CM

## 2023-06-06 DIAGNOSIS — I69.398 DEPRESSION AS LATE EFFECT OF CEREBROVASCULAR ACCIDENT (CVA): ICD-10-CM

## 2023-06-06 DIAGNOSIS — G56.03 CARPAL TUNNEL SYNDROME ON BOTH SIDES: ICD-10-CM

## 2023-06-06 PROCEDURE — 3008F BODY MASS INDEX DOCD: CPT | Mod: ,,, | Performed by: NURSE PRACTITIONER

## 2023-06-06 PROCEDURE — 3074F SYST BP LT 130 MM HG: CPT | Mod: ,,, | Performed by: NURSE PRACTITIONER

## 2023-06-06 PROCEDURE — 99212 PR OFFICE/OUTPT VISIT, EST, LEVL II, 10-19 MIN: ICD-10-PCS | Mod: S$PBB,,, | Performed by: NURSE PRACTITIONER

## 2023-06-06 PROCEDURE — 1160F PR REVIEW ALL MEDS BY PRESCRIBER/CLIN PHARMACIST DOCUMENTED: ICD-10-PCS | Mod: ,,, | Performed by: NURSE PRACTITIONER

## 2023-06-06 PROCEDURE — 1159F PR MEDICATION LIST DOCUMENTED IN MEDICAL RECORD: ICD-10-PCS | Mod: ,,, | Performed by: NURSE PRACTITIONER

## 2023-06-06 PROCEDURE — 99212 OFFICE O/P EST SF 10 MIN: CPT | Mod: S$PBB,,, | Performed by: NURSE PRACTITIONER

## 2023-06-06 PROCEDURE — 3008F PR BODY MASS INDEX (BMI) DOCUMENTED: ICD-10-PCS | Mod: ,,, | Performed by: NURSE PRACTITIONER

## 2023-06-06 PROCEDURE — 1160F RVW MEDS BY RX/DR IN RCRD: CPT | Mod: ,,, | Performed by: NURSE PRACTITIONER

## 2023-06-06 PROCEDURE — 3074F PR MOST RECENT SYSTOLIC BLOOD PRESSURE < 130 MM HG: ICD-10-PCS | Mod: ,,, | Performed by: NURSE PRACTITIONER

## 2023-06-06 PROCEDURE — 1159F MED LIST DOCD IN RCRD: CPT | Mod: ,,, | Performed by: NURSE PRACTITIONER

## 2023-06-06 PROCEDURE — 3078F DIAST BP <80 MM HG: CPT | Mod: ,,, | Performed by: NURSE PRACTITIONER

## 2023-06-06 PROCEDURE — 3078F PR MOST RECENT DIASTOLIC BLOOD PRESSURE < 80 MM HG: ICD-10-PCS | Mod: ,,, | Performed by: NURSE PRACTITIONER

## 2023-06-06 PROCEDURE — 99214 OFFICE O/P EST MOD 30 MIN: CPT | Mod: PBBFAC | Performed by: NURSE PRACTITIONER

## 2023-06-06 NOTE — PATIENT INSTRUCTIONS
Can do physical therapy at any time patient feels she needs  Continue current celexa  Continue the plavix 75mg daily  Continue the lipitor and blood pressure medication  Encouraged good eating and sleep habits  Good glycemic control  Continue the neurontin 900mg Qid  Wear wrist splints at night to help with carpal tunnel symptoms

## 2023-06-06 NOTE — PROGRESS NOTES
Subjective:       Patient ID: Alina Resendez is a 62 y.o. female     Chief Complaint:    Chief Complaint   Patient presents with    Follow-up        Allergies:  Patient has no known allergies.    Current Medications:    Outpatient Encounter Medications as of 6/6/2023   Medication Sig Dispense Refill    amLODIPine (NORVASC) 10 MG tablet Take 10 mg by mouth once daily.      atorvastatin (LIPITOR) 40 MG tablet Take 80 mg by mouth once daily.      citalopram (CELEXA) 10 MG tablet Take 1 tablet (10 mg total) by mouth once daily. 30 tablet 11    clopidogreL (PLAVIX) 75 mg tablet Take 1 tablet (75 mg total) by mouth once daily. 30 tablet 5    gabapentin (NEURONTIN) 600 MG tablet Take 1.5 tablets (900 mg total) by mouth 4 (four) times daily. 180 tablet 5    glimepiride (AMARYL) 2 MG tablet Take 2 mg by mouth before breakfast.      insulin detemir U-100 (LEVEMIR) 100 unit/mL injection Inject 70 Units into the skin every evening.       lansoprazole (PREVACID) 30 MG capsule Take 30 mg by mouth once daily.      metFORMIN (GLUCOPHAGE) 500 MG tablet Take 500 mg by mouth 2 (two) times daily with meals.      traMADoL (ULTRAM) 50 mg tablet Take 50 mg by mouth every 8 (eight) hours as needed.       triamcinolone (NASACORT) 55 mcg nasal inhaler 2 sprays by Nasal route once daily. 16.9 mL 0    [DISCONTINUED] aspirin (ECOTRIN) 81 MG EC tablet Take 81 mg by mouth once daily.       No facility-administered encounter medications on file as of 6/6/2023.       History of Present Illness  63 y/o female following in neurology for reported history of CVA, restless leg syndrome, diabetic polyneuropathy    She formerly followed by Dr. Chen at [facility]    Records indicate CVA was in May of 2021 with reported right hemiparesis and mild speech delay.  Also reported is continued on plavix 75mg and ASA 81mg daily.  She reports prior TIA of at least 2, and 2 CVAs possibly.  She says at the time of her last stroke they had actually held her  "plavix for 5 days so she could have an upper GI scope and this is when the CVA happened, so not plavix failure.  They kept her on ASA and plavix since that time.  We d/c the aspirin and continued the plavix.  Last visit she was reporting balance difficulty and speech difficulty, but MRI brain 5/12/23 was not revealing of any acute changes, only the old infarcts.    She has history of of carpal tunnel with bilateral release 10 years prior with continued bilateral hand weakness    EMG/NCS of the BLE done by Dr. Chen in March of 2022 did not report a radiculopathy nor a polyneuropathy.  MRI 4/2022 copy sent with records shows bilateral hemisphere old lacunar infarcts.  She says the last CVA in May of 2021 caused her right hemiparesis which has improved, she has slight weakness on the RUE and RLE, but she has more difficulty with her speech, at times her speech will "run together" and she has to stop and start over.  Likely her symptoms noted prior visit were related to this given the more recent negative MRI brain.    Diabetic polyneuropathy, prescribed neurontin 900mg QID, so maximum dosing.  Has been on this dosing for few years, denies too much complication and says symptoms of burning in her feet is much worse if she misses a dose.  IN past 1200mg on Tid dosing made her too sleepy, thus the 900mg Qid.    Prior neuropsyche workup for reported memory impairment resulted primarily changes from prior CVA, but also likely contributing is the high dose neurontin    She reports increased agitation at times, since the CVA, I started her with celexa last visit.         Review of Systems  Review of Systems   Constitutional:  Negative for diaphoresis and fever.   HENT:  Negative for congestion, hearing loss and tinnitus.    Eyes:  Negative for blurred vision, double vision, photophobia, discharge and redness.   Respiratory:  Negative for cough and shortness of breath.    Cardiovascular:  Negative for chest pain. "   Gastrointestinal:  Negative for abdominal pain, nausea and vomiting.   Musculoskeletal:  Negative for back pain, falls, joint pain, myalgias and neck pain.   Skin:  Negative for itching and rash.   Neurological:  Positive for tingling, sensory change, speech change and weakness. Negative for dizziness, tremors, focal weakness, seizures, loss of consciousness and headaches.   Psychiatric/Behavioral:  Positive for depression and memory loss. Negative for hallucinations. The patient does not have insomnia.    All other systems reviewed and are negative.   Objective:     NEUROLOGICAL EXAMINATION:     MENTAL STATUS   Oriented to person, place, and time.   Registration: recalls 3 of 3 objects. Recall at 5 minutes: recalls 3 of 3 objects.   Attention: normal. Concentration: normal.   Speech: speech is normal   Level of consciousness: alert  Knowledge: good and consistent with education.   Normal comprehension.     CRANIAL NERVES     CN II   Visual fields full to confrontation.   Visual acuity: normal  Right visual field deficit: none  Left visual field deficit: none     CN III, IV, VI   Pupils are equal, round, and reactive to light.  Extraocular motions are normal.   Right pupil: Size: 3 mm. Shape: regular. Reactivity: brisk. Consensual response: intact. Accommodation: intact.   Left pupil: Size: 3 mm. Shape: regular. Reactivity: brisk. Consensual response: intact. Accommodation: intact.   CN III: no CN III palsy  CN VI: no CN VI palsy  Nystagmus: none   Diplopia: none  Upgaze: normal  Downgaze: normal  Conjugate gaze: present  Vestibulo-ocular reflex: present    CN V   Facial sensation intact.   Right facial sensation deficit: none  Left facial sensation deficit: none  Right corneal reflex: normal  Left corneal reflex: normal    CN VII   Facial expression full, symmetric.   Right facial weakness: none  Left facial weakness: none  Right taste: normal  Left taste: normal    CN VIII   CN VIII normal.   Hearing:  intact    CN IX, X   CN IX normal.   CN X normal.   Palate: symmetric    CN XI   CN XI normal.   Right sternocleidomastoid strength: normal  Left sternocleidomastoid strength: normal  Right trapezius strength: normal  Left trapezius strength: normal    CN XII   CN XII normal.   Tongue: not atrophic  Fasciculations: absent  Tongue deviation: none    MOTOR EXAM   Muscle bulk: normal  Overall muscle tone: normal  Right arm tone: normal  Left arm tone: normal  Right arm pronator drift: absent  Left arm pronator drift: absent  Right leg tone: normal  Left leg tone: normal    Strength   Right neck flexion: 5/5  Left neck flexion: 5/5  Right neck extension: 5/5  Left neck extension: 5/5  Right deltoid: 5/5  Left deltoid: 5/5  Right biceps: 4/5  Left biceps: 5/5  Right triceps: 4/5  Left triceps: 5/5  Right wrist flexion: 5/5  Left wrist flexion: 5/5  Right wrist extension: 4/5  Left wrist extension: 5/5  Right interossei: 4/5  Left interossei: 5/5  Right iliopsoas: 5/5  Left iliopsoas: 5/5  Right quadriceps: 5/5  Left quadriceps: 5/5  Right hamstrin/5  Left hamstrin/5  Right anterior tibial: 5/5  Left anterior tibial: 5/5  Right posterior tibial: 5/5  Left posterior tibial: 5/5  Right gastroc: 5/5  Left gastroc: 5/5    REFLEXES     Reflexes   Right brachioradialis: 2+  Left brachioradialis: 2+  Right biceps: 2+  Left biceps: 2+  Right triceps: 2+  Left triceps: 2+  Right patellar: 2+  Left patellar: 2+  Right achilles: 2+  Left achilles: 2+  Right plantar: normal  Left plantar: normal  Right Jerry: absent  Left Jerry: absent  Right ankle clonus: absent  Left ankle clonus: absent  Right pendular knee jerk: absent  Left pendular knee jerk: absent    SENSORY EXAM   Right arm light touch: decreased from fingers  Left arm light touch: decreased from fingers  Right leg light touch: normal  Left leg light touch: normal  Right arm vibration: decreased from fingers  Left arm vibration: decreased from fingers  Right leg  vibration: normal  Left leg vibration: normal  Right arm proprioception: decreased from fingers  Left arm proprioception: decreased from fingers  Right leg proprioception: normal  Left leg proprioception: normal  Right arm pinprick: decreased from fingers  Left arm pinprick: decreased from fingers  Right leg pinprick: normal  Left leg pinprick: normal  Graphesthesia: normal  Romberg: negative  Stereognosis: normal    GAIT AND COORDINATION     Gait  Gait: wide-based     Coordination   Finger to nose coordination: normal  Heel to shin coordination: normal  Tandem walking coordination: abnormal    Tremor   Resting tremor: absent  Intention tremor: absent  Action tremor: absent     Physical Exam  Vitals and nursing note reviewed.   Constitutional:       Appearance: Normal appearance.   HENT:      Head: Normocephalic.   Eyes:      Extraocular Movements: Extraocular movements intact and EOM normal.      Pupils: Pupils are equal, round, and reactive to light.   Cardiovascular:      Rate and Rhythm: Normal rate and regular rhythm.   Pulmonary:      Effort: Pulmonary effort is normal.      Breath sounds: Normal breath sounds.   Musculoskeletal:         General: No swelling or tenderness. Normal range of motion.      Cervical back: Normal range of motion and neck supple.      Right lower leg: No edema.      Left lower leg: No edema.   Skin:     General: Skin is warm and dry.      Coloration: Skin is not jaundiced.      Findings: No rash.   Neurological:      General: No focal deficit present.      Mental Status: She is alert and oriented to person, place, and time. Mental status is at baseline.      GCS: GCS eye subscore is 4. GCS verbal subscore is 5. GCS motor subscore is 6.      Cranial Nerves: No cranial nerve deficit.      Sensory: Sensory deficit present.      Motor: Weakness present.      Coordination: Coordination is intact. Coordination normal. Finger-Nose-Finger Test, Heel to Shin Test and Romberg Test normal.       Gait: Tandem walk abnormal. Gait normal.      Deep Tendon Reflexes: Reflexes normal.      Reflex Scores:       Tricep reflexes are 2+ on the right side and 2+ on the left side.       Bicep reflexes are 2+ on the right side and 2+ on the left side.       Brachioradialis reflexes are 2+ on the right side and 2+ on the left side.       Patellar reflexes are 2+ on the right side and 2+ on the left side.       Achilles reflexes are 2+ on the right side and 2+ on the left side.  Psychiatric:         Mood and Affect: Mood normal.         Speech: Speech normal.         Behavior: Behavior normal.        Assessment:     Problem List Items Addressed This Visit          Neuro    Cerebrovascular accident (CVA) - Primary    Diabetic polyneuropathy associated with type 1 diabetes mellitus    MCI (mild cognitive impairment)    Carpal tunnel syndrome on both sides    Combined receptive and expressive aphasia as late effect of cerebrovascular accident (CVA)       Psychiatric    Depression as late effect of cerebrovascular accident (CVA)              Primary Diagnosis and ICD10  Cerebrovascular accident (CVA), unspecified mechanism [I63.9]    Plan:     Patient Instructions   Can do physical therapy at any time patient feels she needs  Continue current celexa  Continue the plavix 75mg daily  Continue the lipitor and blood pressure medication  Encouraged good eating and sleep habits  Good glycemic control  Continue the neurontin 900mg Qid  Wear wrist splints at night to help with carpal tunnel symptoms    Medications Discontinued During This Encounter   Medication Reason    aspirin (ECOTRIN) 81 MG EC tablet          Requested Prescriptions      No prescriptions requested or ordered in this encounter       No orders of the defined types were placed in this encounter.

## 2023-07-06 DIAGNOSIS — I63.9 CEREBROVASCULAR ACCIDENT (CVA), UNSPECIFIED MECHANISM: ICD-10-CM

## 2023-07-06 RX ORDER — CLOPIDOGREL BISULFATE 75 MG/1
75 TABLET ORAL DAILY
Qty: 30 TABLET | Refills: 5 | Status: SHIPPED | OUTPATIENT
Start: 2023-07-06 | End: 2023-10-05 | Stop reason: SDUPTHER

## 2023-09-11 PROBLEM — I63.9 CEREBROVASCULAR ACCIDENT (CVA): Status: RESOLVED | Noted: 2022-08-31 | Resolved: 2023-09-11

## 2023-10-05 ENCOUNTER — OFFICE VISIT (OUTPATIENT)
Dept: NEUROLOGY | Facility: CLINIC | Age: 63
End: 2023-10-05
Payer: COMMERCIAL

## 2023-10-05 VITALS
BODY MASS INDEX: 27.29 KG/M2 | HEART RATE: 67 BPM | WEIGHT: 154 LBS | SYSTOLIC BLOOD PRESSURE: 118 MMHG | DIASTOLIC BLOOD PRESSURE: 64 MMHG | HEIGHT: 63 IN | OXYGEN SATURATION: 97 %

## 2023-10-05 DIAGNOSIS — I69.320 COMBINED RECEPTIVE AND EXPRESSIVE APHASIA AS LATE EFFECT OF CEREBROVASCULAR ACCIDENT (CVA): ICD-10-CM

## 2023-10-05 DIAGNOSIS — G31.84 MCI (MILD COGNITIVE IMPAIRMENT): ICD-10-CM

## 2023-10-05 DIAGNOSIS — I63.9 CEREBROVASCULAR ACCIDENT (CVA), UNSPECIFIED MECHANISM: Primary | ICD-10-CM

## 2023-10-05 DIAGNOSIS — G56.03 CARPAL TUNNEL SYNDROME ON BOTH SIDES: ICD-10-CM

## 2023-10-05 DIAGNOSIS — E10.42 DIABETIC POLYNEUROPATHY ASSOCIATED WITH TYPE 1 DIABETES MELLITUS: ICD-10-CM

## 2023-10-05 PROCEDURE — 1159F PR MEDICATION LIST DOCUMENTED IN MEDICAL RECORD: ICD-10-PCS | Mod: S$GLB,,, | Performed by: NURSE PRACTITIONER

## 2023-10-05 PROCEDURE — 3074F PR MOST RECENT SYSTOLIC BLOOD PRESSURE < 130 MM HG: ICD-10-PCS | Mod: S$GLB,,, | Performed by: NURSE PRACTITIONER

## 2023-10-05 PROCEDURE — 99214 OFFICE O/P EST MOD 30 MIN: CPT | Mod: PBBFAC | Performed by: NURSE PRACTITIONER

## 2023-10-05 PROCEDURE — 1160F RVW MEDS BY RX/DR IN RCRD: CPT | Mod: S$GLB,,, | Performed by: NURSE PRACTITIONER

## 2023-10-05 PROCEDURE — 1159F MED LIST DOCD IN RCRD: CPT | Mod: S$GLB,,, | Performed by: NURSE PRACTITIONER

## 2023-10-05 PROCEDURE — 3008F PR BODY MASS INDEX (BMI) DOCUMENTED: ICD-10-PCS | Mod: S$GLB,,, | Performed by: NURSE PRACTITIONER

## 2023-10-05 PROCEDURE — 1160F PR REVIEW ALL MEDS BY PRESCRIBER/CLIN PHARMACIST DOCUMENTED: ICD-10-PCS | Mod: S$GLB,,, | Performed by: NURSE PRACTITIONER

## 2023-10-05 PROCEDURE — 3074F SYST BP LT 130 MM HG: CPT | Mod: S$GLB,,, | Performed by: NURSE PRACTITIONER

## 2023-10-05 PROCEDURE — 3078F DIAST BP <80 MM HG: CPT | Mod: S$GLB,,, | Performed by: NURSE PRACTITIONER

## 2023-10-05 PROCEDURE — 99213 OFFICE O/P EST LOW 20 MIN: CPT | Mod: S$GLB,,, | Performed by: NURSE PRACTITIONER

## 2023-10-05 PROCEDURE — 3078F PR MOST RECENT DIASTOLIC BLOOD PRESSURE < 80 MM HG: ICD-10-PCS | Mod: S$GLB,,, | Performed by: NURSE PRACTITIONER

## 2023-10-05 PROCEDURE — 99213 PR OFFICE/OUTPT VISIT, EST, LEVL III, 20-29 MIN: ICD-10-PCS | Mod: S$GLB,,, | Performed by: NURSE PRACTITIONER

## 2023-10-05 PROCEDURE — 3008F BODY MASS INDEX DOCD: CPT | Mod: S$GLB,,, | Performed by: NURSE PRACTITIONER

## 2023-10-05 RX ORDER — CLOPIDOGREL BISULFATE 75 MG/1
75 TABLET ORAL DAILY
Qty: 30 TABLET | Refills: 11 | Status: SHIPPED | OUTPATIENT
Start: 2023-10-05

## 2023-10-05 RX ORDER — FUROSEMIDE 20 MG/1
TABLET ORAL
COMMUNITY
Start: 2023-09-12

## 2023-10-05 RX ORDER — GABAPENTIN 600 MG/1
900 TABLET ORAL 4 TIMES DAILY
Qty: 180 TABLET | Refills: 5 | Status: SHIPPED | OUTPATIENT
Start: 2023-10-05 | End: 2024-10-04

## 2023-10-05 NOTE — PROGRESS NOTES
Subjective:       Patient ID: Alina Resendez is a 63 y.o. female     Chief Complaint:    Chief Complaint   Patient presents with    Follow-up        Allergies:  Patient has no known allergies.    Current Medications:    Outpatient Encounter Medications as of 10/5/2023   Medication Sig Dispense Refill    amLODIPine (NORVASC) 10 MG tablet Take 10 mg by mouth once daily.      atorvastatin (LIPITOR) 40 MG tablet Take 80 mg by mouth once daily.      citalopram (CELEXA) 10 MG tablet Take 1 tablet (10 mg total) by mouth once daily. 30 tablet 11    furosemide (LASIX) 20 MG tablet       glimepiride (AMARYL) 2 MG tablet Take 2 mg by mouth before breakfast.      insulin detemir U-100 (LEVEMIR) 100 unit/mL injection Inject 70 Units into the skin every evening.       lansoprazole (PREVACID) 30 MG capsule Take 30 mg by mouth once daily.      metFORMIN (GLUCOPHAGE) 500 MG tablet Take 500 mg by mouth 2 (two) times daily with meals.      traMADoL (ULTRAM) 50 mg tablet Take 50 mg by mouth every 8 (eight) hours as needed.       triamcinolone (NASACORT) 55 mcg nasal inhaler 2 sprays by Nasal route once daily. 16.9 mL 0    [DISCONTINUED] clopidogreL (PLAVIX) 75 mg tablet Take 1 tablet (75 mg total) by mouth once daily. 30 tablet 5    clopidogreL (PLAVIX) 75 mg tablet Take 1 tablet (75 mg total) by mouth once daily. 30 tablet 11    gabapentin (NEURONTIN) 600 MG tablet Take 1.5 tablets (900 mg total) by mouth 4 (four) times daily. 180 tablet 5    [DISCONTINUED] gabapentin (NEURONTIN) 600 MG tablet Take 1.5 tablets (900 mg total) by mouth 4 (four) times daily. 180 tablet 5     No facility-administered encounter medications on file as of 10/5/2023.       History of Present Illness  64 y/o female following in neurology for reported history of CVA, restless leg syndrome, diabetic polyneuropathy    She formerly followed by Dr. Chen at [facility]    Records indicate CVA was in May of 2021 with reported right hemiparesis and mild speech  "delay.  Also reported is continued on plavix 75mg and ASA 81mg daily.  She reports prior TIA of at least 2, and 2 CVAs possibly.  She says at the time of her last stroke they had actually held her plavix for 5 days so she could have an upper GI scope and this is when the CVA happened, so not plavix failure.  They kept her on ASA and plavix since that time.  We d/c the aspirin and continued the plavix.  Last visit she was reporting balance difficulty and speech difficulty, but MRI brain 5/12/23 was not revealing of any acute changes, only the old infarcts.    She has history of of carpal tunnel with bilateral release 10 years prior with continued bilateral hand weakness    EMG/NCS of the BLE done by Dr. Chen in March of 2022 did not report a radiculopathy nor a polyneuropathy.  MRI 4/2022 copy sent with records shows bilateral hemisphere old lacunar infarcts.  She says the last CVA in May of 2021 caused her right hemiparesis which has improved, she has slight weakness on the RUE and RLE, but she has more difficulty with her speech, at times her speech will "run together" and she has to stop and start over.  Likely her symptoms noted prior visit were related to this given the more recent negative MRI brain.    Diabetic polyneuropathy, prescribed neurontin 900mg QID, so maximum dosing.  Has been on this dosing for few years, denies too much complication and says symptoms of burning in her feet is much worse if she misses a dose.  IN past 1200mg on Tid dosing made her too sleepy, thus the 900mg Qid.    Prior neuropsyche workup for reported memory impairment resulted primarily changes from prior CVA, but also likely contributing is the high dose neurontin    She reports increased agitation at times, since the CVA, I started her with celexa last visit.           Review of Systems  Review of Systems   Constitutional:  Negative for diaphoresis and fever.   HENT:  Negative for congestion, hearing loss and tinnitus.  "   Eyes:  Negative for blurred vision, double vision, photophobia, discharge and redness.   Respiratory:  Negative for cough and shortness of breath.    Cardiovascular:  Negative for chest pain.   Gastrointestinal:  Negative for abdominal pain, nausea and vomiting.   Musculoskeletal:  Negative for back pain, falls, joint pain, myalgias and neck pain.   Skin:  Negative for itching and rash.   Neurological:  Positive for tingling, sensory change, speech change and weakness. Negative for dizziness, tremors, focal weakness, seizures, loss of consciousness and headaches.   Psychiatric/Behavioral:  Positive for depression and memory loss. Negative for hallucinations. The patient does not have insomnia.    All other systems reviewed and are negative.     Objective:     NEUROLOGICAL EXAMINATION:     MENTAL STATUS   Oriented to person, place, and time.   Registration: recalls 3 of 3 objects. Recall at 5 minutes: recalls 3 of 3 objects.   Attention: normal. Concentration: normal.   Speech: speech is normal   Level of consciousness: alert  Knowledge: good and consistent with education.   Normal comprehension.     CRANIAL NERVES     CN II   Visual fields full to confrontation.   Visual acuity: normal  Right visual field deficit: none  Left visual field deficit: none     CN III, IV, VI   Pupils are equal, round, and reactive to light.  Extraocular motions are normal.   Right pupil: Size: 3 mm. Shape: regular. Reactivity: brisk. Consensual response: intact. Accommodation: intact.   Left pupil: Size: 3 mm. Shape: regular. Reactivity: brisk. Consensual response: intact. Accommodation: intact.   CN III: no CN III palsy  CN VI: no CN VI palsy  Nystagmus: none   Diplopia: none  Upgaze: normal  Downgaze: normal  Conjugate gaze: present  Vestibulo-ocular reflex: present    CN V   Facial sensation intact.   Right facial sensation deficit: none  Left facial sensation deficit: none  Right corneal reflex: normal  Left corneal reflex:  normal    CN VII   Facial expression full, symmetric.   Right facial weakness: none  Left facial weakness: none  Right taste: normal  Left taste: normal    CN VIII   CN VIII normal.   Hearing: intact    CN IX, X   CN IX normal.   CN X normal.   Palate: symmetric    CN XI   CN XI normal.   Right sternocleidomastoid strength: normal  Left sternocleidomastoid strength: normal  Right trapezius strength: normal  Left trapezius strength: normal    CN XII   CN XII normal.   Tongue: not atrophic  Fasciculations: absent  Tongue deviation: none    MOTOR EXAM   Muscle bulk: normal  Overall muscle tone: normal  Right arm tone: normal  Left arm tone: normal  Right arm pronator drift: absent  Left arm pronator drift: absent  Right leg tone: normal  Left leg tone: normal    Strength   Right neck flexion: 5/5  Left neck flexion: 5/5  Right neck extension: 5/5  Left neck extension: 5/5  Right deltoid: 5/5  Left deltoid: 5/5  Right biceps: 4/5  Left biceps: 5/5  Right triceps: 4/5  Left triceps: 5/5  Right wrist flexion: 5/5  Left wrist flexion: 5/5  Right wrist extension: 4/5  Left wrist extension: 5/5  Right interossei: 4/5  Left interossei: 5/5  Right iliopsoas: 5/5  Left iliopsoas: 5/5  Right quadriceps: 5/5  Left quadriceps: 5/5  Right hamstrin/5  Left hamstrin/5  Right anterior tibial: 5/5  Left anterior tibial: 5/5  Right posterior tibial: 5/5  Left posterior tibial: 5/5  Right gastroc: 5/5  Left gastroc: 5/5    REFLEXES     Reflexes   Right brachioradialis: 2+  Left brachioradialis: 2+  Right biceps: 2+  Left biceps: 2+  Right triceps: 2+  Left triceps: 2+  Right patellar: 2+  Left patellar: 2+  Right achilles: 2+  Left achilles: 2+  Right plantar: normal  Left plantar: normal  Right Jerry: absent  Left Jerry: absent  Right ankle clonus: absent  Left ankle clonus: absent  Right pendular knee jerk: absent  Left pendular knee jerk: absent    SENSORY EXAM   Right arm light touch: decreased from fingers  Left arm  light touch: decreased from fingers  Right leg light touch: normal  Left leg light touch: normal  Right arm vibration: decreased from fingers  Left arm vibration: decreased from fingers  Right leg vibration: normal  Left leg vibration: normal  Right arm proprioception: decreased from fingers  Left arm proprioception: decreased from fingers  Right leg proprioception: normal  Left leg proprioception: normal  Right arm pinprick: decreased from fingers  Left arm pinprick: decreased from fingers  Right leg pinprick: normal  Left leg pinprick: normal  Graphesthesia: normal  Romberg: negative  Stereognosis: normal    GAIT AND COORDINATION     Gait  Gait: wide-based     Coordination   Finger to nose coordination: normal  Heel to shin coordination: normal  Tandem walking coordination: abnormal    Tremor   Resting tremor: absent  Intention tremor: absent  Action tremor: absent       Physical Exam  Vitals and nursing note reviewed.   Constitutional:       Appearance: Normal appearance.   HENT:      Head: Normocephalic.   Eyes:      Extraocular Movements: Extraocular movements intact and EOM normal.      Pupils: Pupils are equal, round, and reactive to light.   Cardiovascular:      Rate and Rhythm: Normal rate and regular rhythm.   Pulmonary:      Effort: Pulmonary effort is normal.      Breath sounds: Normal breath sounds.   Musculoskeletal:         General: No swelling or tenderness. Normal range of motion.      Cervical back: Normal range of motion and neck supple.      Right lower leg: No edema.      Left lower leg: No edema.   Skin:     General: Skin is warm and dry.      Coloration: Skin is not jaundiced.      Findings: No rash.   Neurological:      General: No focal deficit present.      Mental Status: She is alert and oriented to person, place, and time. Mental status is at baseline.      GCS: GCS eye subscore is 4. GCS verbal subscore is 5. GCS motor subscore is 6.      Cranial Nerves: No cranial nerve deficit.       Sensory: Sensory deficit present.      Motor: Weakness present.      Coordination: Coordination is intact. Coordination normal. Finger-Nose-Finger Test, Heel to Shin Test and Romberg Test normal.      Gait: Tandem walk abnormal. Gait normal.      Deep Tendon Reflexes: Reflexes normal.      Reflex Scores:       Tricep reflexes are 2+ on the right side and 2+ on the left side.       Bicep reflexes are 2+ on the right side and 2+ on the left side.       Brachioradialis reflexes are 2+ on the right side and 2+ on the left side.       Patellar reflexes are 2+ on the right side and 2+ on the left side.       Achilles reflexes are 2+ on the right side and 2+ on the left side.  Psychiatric:         Mood and Affect: Mood normal.         Speech: Speech normal.         Behavior: Behavior normal.          Assessment:     Problem List Items Addressed This Visit          Neuro    Cerebrovascular accident (CVA) - Primary    Relevant Medications    clopidogreL (PLAVIX) 75 mg tablet    Diabetic polyneuropathy associated with type 1 diabetes mellitus    Relevant Medications    gabapentin (NEURONTIN) 600 MG tablet    MCI (mild cognitive impairment)    Carpal tunnel syndrome on both sides    Combined receptive and expressive aphasia as late effect of cerebrovascular accident (CVA)                Primary Diagnosis and ICD10  Cerebrovascular accident (CVA), unspecified mechanism [I63.9]    Plan:     Patient Instructions   Can do physical therapy at any time patient feels she needs  Continue current celexa  Continue the plavix 75mg daily  Continue the lipitor and blood pressure medication  Encouraged good eating and sleep habits  Good glycemic control  Continue the neurontin 900mg Qid  Wear wrist splints at night to help with carpal tunnel symptoms    Medications Discontinued During This Encounter   Medication Reason    gabapentin (NEURONTIN) 600 MG tablet Reorder    clopidogreL (PLAVIX) 75 mg tablet Reorder           Requested  Prescriptions     Signed Prescriptions Disp Refills    clopidogreL (PLAVIX) 75 mg tablet 30 tablet 11     Sig: Take 1 tablet (75 mg total) by mouth once daily.    gabapentin (NEURONTIN) 600 MG tablet 180 tablet 5     Sig: Take 1.5 tablets (900 mg total) by mouth 4 (four) times daily.       No orders of the defined types were placed in this encounter.

## 2023-10-27 ENCOUNTER — HOSPITAL ENCOUNTER (EMERGENCY)
Facility: HOSPITAL | Age: 63
Discharge: HOME | End: 2023-10-27
Payer: COMMERCIAL

## 2023-10-27 VITALS
RESPIRATION RATE: 18 BRPM | SYSTOLIC BLOOD PRESSURE: 154 MMHG | BODY MASS INDEX: 27.11 KG/M2 | WEIGHT: 153 LBS | DIASTOLIC BLOOD PRESSURE: 74 MMHG | TEMPERATURE: 99 F | OXYGEN SATURATION: 96 % | HEART RATE: 67 BPM | HEIGHT: 63 IN

## 2023-10-27 DIAGNOSIS — S83.91XA SPRAIN OF RIGHT KNEE, UNSPECIFIED LIGAMENT, INITIAL ENCOUNTER: ICD-10-CM

## 2023-10-27 DIAGNOSIS — S80.01XA CONTUSION OF RIGHT KNEE, INITIAL ENCOUNTER: Primary | ICD-10-CM

## 2023-10-27 LAB
ANION GAP SERPL CALCULATED.3IONS-SCNC: 8 MMOL/L (ref 7–16)
BASOPHILS # BLD AUTO: 0.01 K/UL (ref 0–0.2)
BASOPHILS NFR BLD AUTO: 0.2 % (ref 0–1)
BUN SERPL-MCNC: 14 MG/DL (ref 7–18)
BUN/CREAT SERPL: 17 (ref 6–20)
CALCIUM SERPL-MCNC: 9.2 MG/DL (ref 8.5–10.1)
CHLORIDE SERPL-SCNC: 102 MMOL/L (ref 98–107)
CO2 SERPL-SCNC: 32 MMOL/L (ref 21–32)
CREAT SERPL-MCNC: 0.83 MG/DL (ref 0.55–1.02)
DIFFERENTIAL METHOD BLD: ABNORMAL
EGFR (NO RACE VARIABLE) (RUSH/TITUS): 80 ML/MIN/1.73M2
EOSINOPHIL # BLD AUTO: 0.23 K/UL (ref 0–0.5)
EOSINOPHIL NFR BLD AUTO: 4.5 % (ref 1–4)
ERYTHROCYTE [DISTWIDTH] IN BLOOD BY AUTOMATED COUNT: 12 % (ref 11.5–14.5)
ERYTHROCYTE [SEDIMENTATION RATE] IN BLOOD BY WESTERGREN METHOD: 7 MM/HR (ref 0–20)
GLUCOSE SERPL-MCNC: 235 MG/DL (ref 74–106)
HCT VFR BLD AUTO: 37.3 % (ref 38–47)
HGB BLD-MCNC: 12.4 G/DL (ref 12–16)
LYMPHOCYTES # BLD AUTO: 1.58 K/UL (ref 1–4.8)
LYMPHOCYTES NFR BLD AUTO: 30.8 % (ref 27–41)
MCH RBC QN AUTO: 31.7 PG (ref 27–31)
MCHC RBC AUTO-ENTMCNC: 33.2 G/DL (ref 32–36)
MCV RBC AUTO: 95.4 FL (ref 80–96)
MONOCYTES # BLD AUTO: 0.57 K/UL (ref 0–0.8)
MONOCYTES NFR BLD AUTO: 11.1 % (ref 2–6)
MPC BLD CALC-MCNC: 11.9 FL (ref 9.4–12.4)
NEUTROPHILS # BLD AUTO: 2.74 K/UL (ref 1.8–7.7)
NEUTROPHILS NFR BLD AUTO: 53.4 % (ref 53–65)
PLATELET # BLD AUTO: 170 K/UL (ref 150–400)
POTASSIUM SERPL-SCNC: 3.8 MMOL/L (ref 3.5–5.1)
RBC # BLD AUTO: 3.91 M/UL (ref 4.2–5.4)
SODIUM SERPL-SCNC: 138 MMOL/L (ref 136–145)
WBC # BLD AUTO: 5.13 K/UL (ref 4.5–11)

## 2023-10-27 PROCEDURE — 85651 RBC SED RATE NONAUTOMATED: CPT | Performed by: NURSE PRACTITIONER

## 2023-10-27 PROCEDURE — 99284 EMERGENCY DEPT VISIT MOD MDM: CPT

## 2023-10-27 PROCEDURE — 80048 BASIC METABOLIC PNL TOTAL CA: CPT | Performed by: NURSE PRACTITIONER

## 2023-10-27 PROCEDURE — 99284 PR EMERGENCY DEPT VISIT,LEVEL IV: ICD-10-PCS | Mod: ,,, | Performed by: NURSE PRACTITIONER

## 2023-10-27 PROCEDURE — 99284 EMERGENCY DEPT VISIT MOD MDM: CPT | Mod: ,,, | Performed by: NURSE PRACTITIONER

## 2023-10-27 PROCEDURE — 85025 COMPLETE CBC W/AUTO DIFF WBC: CPT | Performed by: NURSE PRACTITIONER

## 2023-10-27 NOTE — ED TRIAGE NOTES
Patient reports right knee pain that started 10 days PTA after tripping over a child that ran in front of her, has tried OTC pain meds and ice but continues to have pain, also has redness to knee surface

## 2023-10-27 NOTE — Clinical Note
"Alina Theodore" Dipti was seen and treated in our emergency department on 10/27/2023.  She may return to work on 10/30/2023.  If pain and swelling has improved.     If you have any questions or concerns, please don't hesitate to call.      Yolanda Akers NP"

## 2023-10-27 NOTE — ED PROVIDER NOTES
Encounter Date: 10/27/2023       History     Chief Complaint   Patient presents with    Knee Pain     Presented with c/o pain and swelling to right knee after falling down on right knee 10 days ago. States pain has been persistent and swelling has increased. States has been unable to bear weight fully. States is concerned about infection but notes fall did not result in a wound. Denies history of problems or injury right knee. States has not sought medical attention until now. Denies fever or chills.       Review of patient's allergies indicates:  No Known Allergies  Past Medical History:   Diagnosis Date    Diabetes mellitus     Hypertension     Peripheral neuropathy     Stroke      Past Surgical History:   Procedure Laterality Date    CAROTID ENDARTERECTOMY Right     TONSILLECTOMY       Family History   Problem Relation Age of Onset    Heart disease Mother     Hypertension Mother     Hypertension Father     Cancer Sister     Hypertension Brother      Social History     Tobacco Use    Smoking status: Former     Types: Cigarettes     Passive exposure: Past    Smokeless tobacco: Never   Substance Use Topics    Alcohol use: Never    Drug use: Never     Review of Systems   Constitutional:  Positive for activity change. Negative for appetite change and fever.   HENT: Negative.     Respiratory: Negative.  Negative for shortness of breath.    Cardiovascular: Negative.    Gastrointestinal: Negative.    Genitourinary: Negative.    Musculoskeletal:  Positive for arthralgias (right knee) and gait problem (due to right knee pain).   Psychiatric/Behavioral: Negative.         Physical Exam     Initial Vitals [10/27/23 1349]   BP Pulse Resp Temp SpO2   (!) 154/61 75 16 98.6 °F (37 °C) 97 %      MAP       --         Physical Exam    Nursing note and vitals reviewed.  Constitutional: She appears well-developed and well-nourished. No distress.   HENT:   Head: Normocephalic.   Nose: Nose normal.   Mouth/Throat: Oropharynx is clear  and moist.   Eyes: EOM are normal.   Neck: Neck supple.   Normal range of motion.  Cardiovascular:  Normal rate and regular rhythm.           Pulmonary/Chest: Breath sounds normal.   Abdominal: Abdomen is soft. Bowel sounds are normal.   Musculoskeletal:         General: Tenderness (right knee with erythema and warmth) and edema (right knee) present.      Cervical back: Normal range of motion and neck supple.     Neurological: She is alert and oriented to person, place, and time. She has normal strength. GCS score is 15. GCS eye subscore is 4. GCS verbal subscore is 5. GCS motor subscore is 6.   Skin: Skin is warm and dry. Capillary refill takes less than 2 seconds.   Psychiatric: She has a normal mood and affect.         Medical Screening Exam   See Full Note    ED Course   Procedures  Labs Reviewed   BASIC METABOLIC PANEL - Abnormal; Notable for the following components:       Result Value    Glucose 235 (*)     All other components within normal limits   CBC WITH DIFFERENTIAL - Abnormal; Notable for the following components:    RBC 3.91 (*)     Hematocrit 37.3 (*)     MCH 31.7 (*)     Monocytes % 11.1 (*)     Eosinophils % 4.5 (*)     All other components within normal limits   SEDIMENTATION RATE, AUTOMATED - Normal   CBC W/ AUTO DIFFERENTIAL    Narrative:     The following orders were created for panel order CBC Auto Differential.  Procedure                               Abnormality         Status                     ---------                               -----------         ------                     CBC with Differential[[id number]]        Abnormal            Final result                 Please view results for these tests on the individual orders.          Imaging Results              X-Ray Knee 1 or 2 View Right (Final result)  Result time 10/27/23 14:51:35      Final result by Arturo Barnes MD (10/27/23 14:51:35)                   Impression:      Prepatellar soft tissue swelling.  No fracture or  dislocation.      Electronically signed by: Arturo Barnes  Date:    10/27/2023  Time:    14:51               Narrative:    EXAMINATION:  XR KNEE 1 OR 2 VIEW RIGHT    CLINICAL HISTORY:  injury to right knee;    TECHNIQUE:  AP and lateral views of the right knee were performed.    COMPARISON:  None    FINDINGS:  No fracture or dislocation identified.  There are mild tricompartmental osteoarthritic changes with very small osteophytes seen at the patella.  Joint spaces are fairly well preserved.  There is some prepatellar soft tissue swelling.                                    X-Rays:   Independently Interpreted Readings:   Other Readings:  Right knee shows no fracture or dislocation identified, mild tricompartmental osteoarthritic changes with very small osteophytes seen at the patella.  Joint spaces are fairly well preserved.  There is some prepatellar soft tissue swelling.    Medications - No data to display  Medical Decision Making  Presented with c/o pain and swelling to right knee after falling down on right knee 10 days ago.  pain has been persistent and swelling has increased.  has been unable to bear weight fully.  is concerned about infection but notes fall did not result in a wound. Denies history of problems or injury right knee.  has not sought medical attention until now. Denies fever or chills.     Amount and/or Complexity of Data Reviewed  Labs: ordered. Decision-making details documented in ED Course.  Radiology: ordered. Decision-making details documented in ED Course.    Risk  OTC drugs.  Risk Details: ACE wrap, Crutches. Instructed on RICE and pain control at home and return precautions.  has been continuing to work as a  since injury. Off work for next 3 days. Discharged home with detailed instructions provided.               ED Course as of 10/27/23 1656   Fri Oct 27, 2023   1530 Right knee xray shows no fracture or dislocation identified, mild tricompartmental  osteoarthritic changes with very small osteophytes seen at the patella.  Joint spaces are fairly well preserved.  There is some prepatellar soft tissue swelling. [DP]   1546 Sodium: 138 [DP]   1546 Potassium: 3.8 [DP]   1546 BUN: 14 [DP]   1546 Creatinine: 0.83 [DP]   1546 WBC: 5.13 [DP]   1546 Hemoglobin: 12.4 [DP]   1546 Hematocrit(!): 37.3 [DP]   1546 Platelet Count: 170 [DP]   1546 Neutrophils Relative: 53.4 [DP]   [facility] [DP]      ED Course User Index  [DP] Yolanda Akers NP                    Clinical Impression:   Final diagnoses:  [S83.91XA] Sprain of right knee, unspecified ligament, initial encounter  [S80.01XA] Contusion of right knee, initial encounter (Primary)        ED Disposition Condition    Discharge Stable          ED Prescriptions    None       Follow-up Information    None          Yolanda Akers NP  10/27/23 7030

## 2023-11-03 ENCOUNTER — OFFICE VISIT (OUTPATIENT)
Dept: FAMILY MEDICINE | Facility: CLINIC | Age: 63
End: 2023-11-03
Payer: COMMERCIAL

## 2023-11-03 VITALS
RESPIRATION RATE: 18 BRPM | WEIGHT: 156 LBS | OXYGEN SATURATION: 93 % | DIASTOLIC BLOOD PRESSURE: 80 MMHG | BODY MASS INDEX: 27.64 KG/M2 | TEMPERATURE: 97 F | SYSTOLIC BLOOD PRESSURE: 122 MMHG | HEIGHT: 63 IN | HEART RATE: 91 BPM

## 2023-11-03 DIAGNOSIS — B02.9 HERPES ZOSTER WITHOUT COMPLICATION: Primary | ICD-10-CM

## 2023-11-03 PROCEDURE — 3074F PR MOST RECENT SYSTOLIC BLOOD PRESSURE < 130 MM HG: ICD-10-PCS | Mod: ,,, | Performed by: NURSE PRACTITIONER

## 2023-11-03 PROCEDURE — 99213 OFFICE O/P EST LOW 20 MIN: CPT | Mod: ,,, | Performed by: NURSE PRACTITIONER

## 2023-11-03 PROCEDURE — 99213 PR OFFICE/OUTPT VISIT, EST, LEVL III, 20-29 MIN: ICD-10-PCS | Mod: ,,, | Performed by: NURSE PRACTITIONER

## 2023-11-03 PROCEDURE — 1160F RVW MEDS BY RX/DR IN RCRD: CPT | Mod: ,,, | Performed by: NURSE PRACTITIONER

## 2023-11-03 PROCEDURE — 1159F PR MEDICATION LIST DOCUMENTED IN MEDICAL RECORD: ICD-10-PCS | Mod: ,,, | Performed by: NURSE PRACTITIONER

## 2023-11-03 PROCEDURE — 3008F BODY MASS INDEX DOCD: CPT | Mod: ,,, | Performed by: NURSE PRACTITIONER

## 2023-11-03 PROCEDURE — 3079F DIAST BP 80-89 MM HG: CPT | Mod: ,,, | Performed by: NURSE PRACTITIONER

## 2023-11-03 PROCEDURE — 1159F MED LIST DOCD IN RCRD: CPT | Mod: ,,, | Performed by: NURSE PRACTITIONER

## 2023-11-03 PROCEDURE — 3074F SYST BP LT 130 MM HG: CPT | Mod: ,,, | Performed by: NURSE PRACTITIONER

## 2023-11-03 PROCEDURE — 3008F PR BODY MASS INDEX (BMI) DOCUMENTED: ICD-10-PCS | Mod: ,,, | Performed by: NURSE PRACTITIONER

## 2023-11-03 PROCEDURE — 1160F PR REVIEW ALL MEDS BY PRESCRIBER/CLIN PHARMACIST DOCUMENTED: ICD-10-PCS | Mod: ,,, | Performed by: NURSE PRACTITIONER

## 2023-11-03 PROCEDURE — 3079F PR MOST RECENT DIASTOLIC BLOOD PRESSURE 80-89 MM HG: ICD-10-PCS | Mod: ,,, | Performed by: NURSE PRACTITIONER

## 2023-11-03 RX ORDER — VALACYCLOVIR HYDROCHLORIDE 1 G/1
1000 TABLET, FILM COATED ORAL EVERY 8 HOURS
Qty: 21 TABLET | Refills: 0 | Status: SHIPPED | OUTPATIENT
Start: 2023-11-03 | End: 2023-11-10

## 2023-11-03 NOTE — PATIENT INSTRUCTIONS
Rx as directed. Keep area clean, dry in an attempt to prevent secondary bacterial infection. The virus can be passed to a person who has never had chickenpox. This person may get chickenpox, but not shingles. You may pass the virus to others as long as you have a rash. The virus is spread by direct contact with the fluid from the blisters. Usually, you can not spread the virus once the blisters dry up. RTC for persisting and/or worsening of symptoms.

## 2023-11-03 NOTE — PROGRESS NOTES
"Subjective:       Alina Resendez is a 63 y.o. female who presents for evaluation of a rash involving the  left back extending to left side/abdomen . Rash started 3 days ago. Lesions are pink, and blistering in texture. Rash has changed over time. Rash is painful and is pruritic. Associated symptoms:  pain . Patient denies: fever. Patient has not had contacts with similar rash. Patient has not had new exposures (soaps, lotions, laundry detergents, foods, medications, plants, insects or animals).    Review of Systems  Pertinent items are noted in HPI.      Objective:      /80 (BP Location: Left arm, Patient Position: Sitting, BP Method: Large (Manual))   Pulse 91   Temp 97 °F (36.1 °C) (Temporal)   Resp 18   Ht 5' 3" (1.6 m)   Wt 70.8 kg (156 lb)   SpO2 (!) 93%   BMI 27.63 kg/m²   General:  alert, appears stated age, and cooperative   Skin:  vesicles noted on left back extending to left side and abdomen. Not crossing midline. Warm to touch. Erythematous. No drainage at current. Painful with palpation.    Heart: normal rate and rhythm. No murmur, gallop, or rub.  Lungs: CTA bilaterally  Assessment:      Shingles, herpes zoster      Plan:      Medications: Valtrex.  Verbal patient instruction given.  Rx as directed. Keep area clean, dry in an attempt to prevent secondary bacterial infection. The virus can be passed to a person who has never had chickenpox. This person may get chickenpox, but not shingles. You may pass the virus to others as long as you have a rash. The virus is spread by direct contact with the fluid from the blisters. Usually, you can not spread the virus once the blisters dry up. RTC for persisting and/or worsening of symptoms.   "

## 2023-11-13 ENCOUNTER — TELEPHONE (OUTPATIENT)
Dept: NEUROLOGY | Facility: CLINIC | Age: 63
End: 2023-11-13
Payer: COMMERCIAL

## 2023-11-13 NOTE — TELEPHONE ENCOUNTER
Called pt and gave her the information below from JP Moya NP. She v/u.        ----- Message from SELENE Estevez sent at 11/13/2023  9:56 AM CST -----  Unfortunately she is already on maximum dosing of neurontin, which is usually the best medication to help with those symptoms  ----- Message -----  From: Kathia Cooney LPN  Sent: 11/13/2023   9:10 AM CST  To: SELENE Estevez    Pt called states she has shingles and have a lot of pain. She was asking if you could give her something to help the pain. She is already on 900mg Neurontin QID.

## 2023-11-20 DIAGNOSIS — R73.09 HEMOGLOBIN A1C 8.0% OR GREATER: Primary | ICD-10-CM

## 2024-01-08 PROBLEM — I63.9 CEREBROVASCULAR ACCIDENT (CVA): Status: RESOLVED | Noted: 2022-08-31 | Resolved: 2024-01-08

## 2024-03-12 DIAGNOSIS — I69.398 DEPRESSION AS LATE EFFECT OF CEREBROVASCULAR ACCIDENT (CVA): ICD-10-CM

## 2024-03-12 DIAGNOSIS — F06.31 DEPRESSION AS LATE EFFECT OF CEREBROVASCULAR ACCIDENT (CVA): ICD-10-CM

## 2024-03-12 RX ORDER — CITALOPRAM 10 MG/1
10 TABLET ORAL DAILY
Qty: 30 TABLET | Refills: 5 | Status: SHIPPED | OUTPATIENT
Start: 2024-03-12 | End: 2025-03-12

## 2024-05-14 DIAGNOSIS — E10.42 DIABETIC POLYNEUROPATHY ASSOCIATED WITH TYPE 1 DIABETES MELLITUS: ICD-10-CM

## 2024-05-14 RX ORDER — GABAPENTIN 600 MG/1
900 TABLET ORAL 3 TIMES DAILY
Qty: 180 TABLET | Refills: 5 | Status: SHIPPED | OUTPATIENT
Start: 2024-05-14 | End: 2025-05-14

## 2024-08-06 ENCOUNTER — OFFICE VISIT (OUTPATIENT)
Dept: NEUROLOGY | Facility: CLINIC | Age: 64
End: 2024-08-06
Payer: COMMERCIAL

## 2024-08-06 VITALS
RESPIRATION RATE: 18 BRPM | BODY MASS INDEX: 26.82 KG/M2 | DIASTOLIC BLOOD PRESSURE: 55 MMHG | SYSTOLIC BLOOD PRESSURE: 125 MMHG | HEIGHT: 63 IN | HEART RATE: 93 BPM | WEIGHT: 151.38 LBS

## 2024-08-06 DIAGNOSIS — I63.9 CEREBROVASCULAR ACCIDENT (CVA), UNSPECIFIED MECHANISM: ICD-10-CM

## 2024-08-06 DIAGNOSIS — G31.84 MCI (MILD COGNITIVE IMPAIRMENT): ICD-10-CM

## 2024-08-06 DIAGNOSIS — E10.42 DIABETIC POLYNEUROPATHY ASSOCIATED WITH TYPE 1 DIABETES MELLITUS: ICD-10-CM

## 2024-08-06 DIAGNOSIS — M54.9 DORSALGIA, UNSPECIFIED: Primary | ICD-10-CM

## 2024-08-06 DIAGNOSIS — I69.320 COMBINED RECEPTIVE AND EXPRESSIVE APHASIA AS LATE EFFECT OF CEREBROVASCULAR ACCIDENT (CVA): ICD-10-CM

## 2024-08-06 PROCEDURE — 99999 PR PBB SHADOW E&M-EST. PATIENT-LVL V: CPT | Mod: PBBFAC,,, | Performed by: NURSE PRACTITIONER

## 2024-08-06 PROCEDURE — 1160F RVW MEDS BY RX/DR IN RCRD: CPT | Mod: S$GLB,,, | Performed by: NURSE PRACTITIONER

## 2024-08-06 PROCEDURE — 99213 OFFICE O/P EST LOW 20 MIN: CPT | Mod: S$GLB,,, | Performed by: NURSE PRACTITIONER

## 2024-08-06 PROCEDURE — 3078F DIAST BP <80 MM HG: CPT | Mod: S$GLB,,, | Performed by: NURSE PRACTITIONER

## 2024-08-06 PROCEDURE — 3008F BODY MASS INDEX DOCD: CPT | Mod: S$GLB,,, | Performed by: NURSE PRACTITIONER

## 2024-08-06 PROCEDURE — 1159F MED LIST DOCD IN RCRD: CPT | Mod: S$GLB,,, | Performed by: NURSE PRACTITIONER

## 2024-08-06 PROCEDURE — 3074F SYST BP LT 130 MM HG: CPT | Mod: S$GLB,,, | Performed by: NURSE PRACTITIONER

## 2024-08-06 RX ORDER — INSULIN GLARGINE 100 [IU]/ML
INJECTION, SOLUTION SUBCUTANEOUS
COMMUNITY
Start: 2024-06-06

## 2024-08-06 RX ORDER — BLOOD-GLUCOSE SENSOR
EACH MISCELLANEOUS
COMMUNITY
Start: 2024-07-12

## 2024-08-06 RX ORDER — TIZANIDINE 4 MG/1
4 TABLET ORAL EVERY 6 HOURS PRN
Qty: 30 TABLET | Refills: 1 | Status: SHIPPED | OUTPATIENT
Start: 2024-08-06 | End: 2024-08-21

## 2024-08-06 RX ORDER — PEN NEEDLE, DIABETIC 31 GX5/16"
NEEDLE, DISPOSABLE MISCELLANEOUS
COMMUNITY
Start: 2024-03-14

## 2024-08-08 DIAGNOSIS — I69.398 DEPRESSION AS LATE EFFECT OF CEREBROVASCULAR ACCIDENT (CVA): ICD-10-CM

## 2024-08-08 DIAGNOSIS — F06.31 DEPRESSION AS LATE EFFECT OF CEREBROVASCULAR ACCIDENT (CVA): ICD-10-CM

## 2024-08-08 RX ORDER — CITALOPRAM 10 MG/1
10 TABLET ORAL DAILY
Qty: 30 TABLET | Refills: 5 | Status: SHIPPED | OUTPATIENT
Start: 2024-08-08 | End: 2025-08-08

## 2024-10-17 DIAGNOSIS — I63.9 CEREBROVASCULAR ACCIDENT (CVA), UNSPECIFIED MECHANISM: ICD-10-CM

## 2024-10-17 RX ORDER — CLOPIDOGREL BISULFATE 75 MG/1
75 TABLET ORAL DAILY
Qty: 30 TABLET | Refills: 11 | Status: SHIPPED | OUTPATIENT
Start: 2024-10-17

## 2025-01-02 ENCOUNTER — OFFICE VISIT (OUTPATIENT)
Dept: NEUROLOGY | Facility: CLINIC | Age: 65
End: 2025-01-02
Payer: COMMERCIAL

## 2025-01-02 VITALS
OXYGEN SATURATION: 99 % | WEIGHT: 151 LBS | DIASTOLIC BLOOD PRESSURE: 65 MMHG | HEIGHT: 63 IN | HEART RATE: 61 BPM | RESPIRATION RATE: 18 BRPM | BODY MASS INDEX: 26.75 KG/M2 | SYSTOLIC BLOOD PRESSURE: 139 MMHG

## 2025-01-02 DIAGNOSIS — I69.398 DEPRESSION AS LATE EFFECT OF CEREBROVASCULAR ACCIDENT (CVA): ICD-10-CM

## 2025-01-02 DIAGNOSIS — G31.84 MCI (MILD COGNITIVE IMPAIRMENT): ICD-10-CM

## 2025-01-02 DIAGNOSIS — F06.31 DEPRESSION AS LATE EFFECT OF CEREBROVASCULAR ACCIDENT (CVA): ICD-10-CM

## 2025-01-02 DIAGNOSIS — I69.320 COMBINED RECEPTIVE AND EXPRESSIVE APHASIA AS LATE EFFECT OF CEREBROVASCULAR ACCIDENT (CVA): ICD-10-CM

## 2025-01-02 DIAGNOSIS — E10.42 DIABETIC POLYNEUROPATHY ASSOCIATED WITH TYPE 1 DIABETES MELLITUS: ICD-10-CM

## 2025-01-02 DIAGNOSIS — I63.9 CEREBROVASCULAR ACCIDENT (CVA), UNSPECIFIED MECHANISM: Primary | ICD-10-CM

## 2025-01-02 PROCEDURE — 1159F MED LIST DOCD IN RCRD: CPT | Mod: S$GLB,,, | Performed by: NURSE PRACTITIONER

## 2025-01-02 PROCEDURE — 3078F DIAST BP <80 MM HG: CPT | Mod: S$GLB,,, | Performed by: NURSE PRACTITIONER

## 2025-01-02 PROCEDURE — 99213 OFFICE O/P EST LOW 20 MIN: CPT | Mod: S$GLB,,, | Performed by: NURSE PRACTITIONER

## 2025-01-02 PROCEDURE — 1160F RVW MEDS BY RX/DR IN RCRD: CPT | Mod: S$GLB,,, | Performed by: NURSE PRACTITIONER

## 2025-01-02 PROCEDURE — 3008F BODY MASS INDEX DOCD: CPT | Mod: S$GLB,,, | Performed by: NURSE PRACTITIONER

## 2025-01-02 PROCEDURE — 3075F SYST BP GE 130 - 139MM HG: CPT | Mod: S$GLB,,, | Performed by: NURSE PRACTITIONER

## 2025-01-02 PROCEDURE — 99999 PR PBB SHADOW E&M-EST. PATIENT-LVL IV: CPT | Mod: PBBFAC,,, | Performed by: NURSE PRACTITIONER

## 2025-01-02 RX ORDER — GABAPENTIN 600 MG/1
900 TABLET ORAL 3 TIMES DAILY
Qty: 180 TABLET | Refills: 5 | Status: SHIPPED | OUTPATIENT
Start: 2025-01-02 | End: 2026-01-02

## 2025-01-02 RX ORDER — CITALOPRAM 10 MG/1
20 TABLET ORAL DAILY
Qty: 60 TABLET | Refills: 5 | Status: SHIPPED | OUTPATIENT
Start: 2025-01-02 | End: 2026-01-02

## 2025-01-02 RX ORDER — ATORVASTATIN CALCIUM 80 MG/1
80 TABLET, FILM COATED ORAL
COMMUNITY
Start: 2024-10-17

## 2025-01-02 NOTE — PROGRESS NOTES
"    Subjective:       Patient ID: Alina Resendez is a 64 y.o. female     Chief Complaint:    Chief Complaint   Patient presents with    Follow-up        Allergies:  Patient has no known allergies.    Current Medications:    Outpatient Encounter Medications as of 1/2/2025   Medication Sig Dispense Refill    amLODIPine (NORVASC) 10 MG tablet Take 10 mg by mouth once daily.      atorvastatin (LIPITOR) 80 MG tablet Take 80 mg by mouth.      BD ULTRA-FINE MINI PEN NEEDLE 31 gauge x 3/16" Ndle       clopidogreL (PLAVIX) 75 mg tablet Take 1 tablet (75 mg total) by mouth once daily. 30 tablet 11    DEXCOM G7 SENSOR Marta       insulin detemir U-100 (LEVEMIR) 100 unit/mL injection Inject 70 Units into the skin every evening.       lansoprazole (PREVACID) 30 MG capsule Take 30 mg by mouth once daily.      LANTUS SOLOSTAR U-100 INSULIN 100 unit/mL (3 mL) InPn pen       [DISCONTINUED] citalopram (CELEXA) 10 MG tablet Take 1 tablet (10 mg total) by mouth once daily. 30 tablet 5    [DISCONTINUED] gabapentin (NEURONTIN) 600 MG tablet Take 1.5 tablets (900 mg total) by mouth 3 (three) times daily. 180 tablet 5    citalopram (CELEXA) 10 MG tablet Take 2 tablets (20 mg total) by mouth once daily. 60 tablet 5    gabapentin (NEURONTIN) 600 MG tablet Take 1.5 tablets (900 mg total) by mouth 3 (three) times daily. 180 tablet 5    [DISCONTINUED] atorvastatin (LIPITOR) 40 MG tablet Take 80 mg by mouth once daily. (Patient not taking: Reported on 1/2/2025)      [DISCONTINUED] furosemide (LASIX) 20 MG tablet  (Patient not taking: Reported on 1/2/2025)      [DISCONTINUED] traMADoL (ULTRAM) 50 mg tablet Take 50 mg by mouth every 8 (eight) hours as needed.  (Patient not taking: Reported on 1/2/2025)       Facility-Administered Encounter Medications as of 1/2/2025   Medication Dose Route Frequency Provider Last Rate Last Admin    [DISCONTINUED] acetaminophen tablet  650 mg Oral  Provider, Generic External Data        [DISCONTINUED] amLODIPine " tablet  5 mg Oral  Provider, Generic External Data        [DISCONTINUED] atorvastatin tablet  80 mg Oral  Provider, Generic External Data        [DISCONTINUED] dextrose 5 % and 0.45 % NaCl infusion  100 mL/hr Intravenous  Provider, Generic External Data        [DISCONTINUED] diphenhydrAMINE injection  25 mg Intravenous  Provider, Generic External Data        [DISCONTINUED] GENERIC EXTERNAL MEDICATION  1 g Intravenous  Provider, Generic External Data        [DISCONTINUED] guaiFENesin 12 hr tablet  600 mg Oral  Provider, Generic External Data        [DISCONTINUED] heparin (porcine) injection  5,000 Units Subcutaneous  Provider, Generic External Data        [DISCONTINUED] hydrALAZINE injection  10 mg Intravenous  Provider, Generic External Data        [DISCONTINUED] HYDROcodone-acetaminophen 5-325 mg per tablet  1 tablet Oral  Provider, Generic External Data        [DISCONTINUED] insulin glargine U-100 (Lantus) injection  15 Units Subcutaneous  Provider, Generic External Data        [DISCONTINUED] magnesium oxide tablet  800 mg Oral  Provider, Generic External Data        [DISCONTINUED] morphine injection  2 mg Intravenous  Provider, Generic External Data        [DISCONTINUED] naloxone injection  0.08 mg Intravenous  Provider, Generic External Data        [DISCONTINUED] ondansetron injection  4 mg Intravenous  Provider, Generic External Data        [DISCONTINUED] potassium chloride SA CR tablet  40 mEq Oral  Provider, Generic External Data        [DISCONTINUED] potassium, sodium phosphates 280-160-250 mg packet  2 packet Oral  Provider, Generic External Data        [DISCONTINUED] SITagliptin phosphate tablet  100 mg Oral  Provider, Generic External Data        [DISCONTINUED] traZODone tablet  25 mg Oral  Provider, Generic External Data           History of Present Illness  63 y/o female following in neurology for reported history of CVA, restless leg syndrome, diabetic polyneuropathy.    She formerly followed by   "[facility]    Records indicate CVA was in May of 2021 with reported right hemiparesis and mild speech delay.  Also reported was continued on plavix 75mg and ASA 81mg daily.  She reports prior TIA of at least 2, and 2 CVAs possibly.  She says at the time of her last stroke they had actually held her plavix for 5 days so she could have an upper GI scope and this is when the CVA happened, so not plavix failure.  They kept her on ASA and plavix though duel therapy not recommended long term and ultimately we d/c the ASA.     Last MRI brain 5/12/23, negative    She has history of of carpal tunnel with bilateral release 10 years prior with continued bilateral hand weakness    EMG/NCS of the BLE done by Dr. Chen in March of 2022 did not report a radiculopathy nor a polyneuropathy.  MRI 4/2022 copy sent with records shows bilateral hemisphere old lacunar infarcts.  She says the last CVA in May of 2021 caused her right hemiparesis which has improved, she has slight weakness on the RUE and RLE, but she has more difficulty with her speech, at times her speech will "run together" and she has to stop and start over.  Likely her symptoms noted prior visit were related to this given the more recent negative MRI brain.    Diabetic polyneuropathy, prescribed neurontin 900mg QID, so maximum dosing.  Has been on this dosing for few years, denies too much complication and says symptoms of burning in her feet is much worse if she misses a dose.  IN past 1200mg on Tid dosing made her too sleepy, thus the 900mg Qid.    Prior neuropsyche workup for reported memory impairment resulted primarily changes from prior CVA, but also likely contributing is the high dose neurontin    She is prescribed celexa due to reported agitation after her last CVA and she has done well with this, current dosing of 20mg           Review of Systems  Review of Systems   Constitutional:  Negative for diaphoresis and fever.   HENT:  Negative for " congestion, hearing loss and tinnitus.    Eyes:  Negative for blurred vision, double vision, photophobia, discharge and redness.   Respiratory:  Negative for cough and shortness of breath.    Cardiovascular:  Negative for chest pain.   Gastrointestinal:  Negative for abdominal pain, nausea and vomiting.   Musculoskeletal:  Negative for back pain, falls, joint pain, myalgias and neck pain.   Skin:  Negative for itching and rash.   Neurological:  Positive for tingling, sensory change, speech change and weakness. Negative for dizziness, tremors, focal weakness, seizures, loss of consciousness and headaches.   Psychiatric/Behavioral:  Positive for depression and memory loss. Negative for hallucinations. The patient does not have insomnia.    All other systems reviewed and are negative.     Objective:     NEUROLOGICAL EXAMINATION:     MENTAL STATUS   Oriented to person, place, and time.   Registration: recalls 3 of 3 objects. Recall at 5 minutes: recalls 3 of 3 objects.   Attention: normal. Concentration: normal.   Speech: speech is normal   Level of consciousness: alert  Knowledge: good and consistent with education.   Normal comprehension.     CRANIAL NERVES     CN II   Visual fields full to confrontation.   Visual acuity: normal  Right visual field deficit: none  Left visual field deficit: none     CN III, IV, VI   Pupils are equal, round, and reactive to light.  Extraocular motions are normal.   Right pupil: Size: 3 mm. Shape: regular. Reactivity: brisk. Consensual response: intact. Accommodation: intact.   Left pupil: Size: 3 mm. Shape: regular. Reactivity: brisk. Consensual response: intact. Accommodation: intact.   CN III: no CN III palsy  CN VI: no CN VI palsy  Nystagmus: none   Diplopia: none  Upgaze: normal  Downgaze: normal  Conjugate gaze: present  Vestibulo-ocular reflex: present    CN V   Facial sensation intact.   Right facial sensation deficit: none  Left facial sensation deficit: none  Right corneal  reflex: normal  Left corneal reflex: normal    CN VII   Facial expression full, symmetric.   Right facial weakness: none  Left facial weakness: none  Right taste: normal  Left taste: normal    CN VIII   CN VIII normal.   Hearing: intact    CN IX, X   CN IX normal.   CN X normal.   Palate: symmetric    CN XI   CN XI normal.   Right sternocleidomastoid strength: normal  Left sternocleidomastoid strength: normal  Right trapezius strength: normal  Left trapezius strength: normal    CN XII   CN XII normal.   Tongue: not atrophic  Fasciculations: absent  Tongue deviation: none    MOTOR EXAM   Muscle bulk: normal  Overall muscle tone: normal  Right arm tone: normal  Left arm tone: normal  Right arm pronator drift: absent  Left arm pronator drift: absent  Right leg tone: normal  Left leg tone: normal    Strength   Right neck flexion: 5/5  Left neck flexion: 5/5  Right neck extension: 5/5  Left neck extension: 5/5  Right deltoid: 5/5  Left deltoid: 5/5  Right biceps: 4/5  Left biceps: 5/5  Right triceps: 4/5  Left triceps: 5/5  Right wrist flexion: 5/5  Left wrist flexion: 5/5  Right wrist extension: 4/5  Left wrist extension: 5/5  Right interossei: 4/5  Left interossei: 5/5  Right iliopsoas: 5/5  Left iliopsoas: 5/5  Right quadriceps: 5/5  Left quadriceps: 5/5  Right hamstrin/5  Left hamstrin/5  Right anterior tibial: 5/5  Left anterior tibial: 5/5  Right posterior tibial: 5/5  Left posterior tibial: 5/5  Right gastroc: 5/5  Left gastroc: 5/5    REFLEXES     Reflexes   Right brachioradialis: 2+  Left brachioradialis: 2+  Right biceps: 2+  Left biceps: 2+  Right triceps: 2+  Left triceps: 2+  Right patellar: 2+  Left patellar: 2+  Right achilles: 2+  Left achilles: 2+  Right plantar: normal  Left plantar: normal  Right Jerry: absent  Left Jerry: absent  Right ankle clonus: absent  Left ankle clonus: absent  Right pendular knee jerk: absent  Left pendular knee jerk: absent    SENSORY EXAM   Right arm light touch:  decreased from fingers  Left arm light touch: decreased from fingers  Right leg light touch: normal  Left leg light touch: normal  Right arm vibration: decreased from fingers  Left arm vibration: decreased from fingers  Right leg vibration: normal  Left leg vibration: normal  Right arm proprioception: decreased from fingers  Left arm proprioception: decreased from fingers  Right leg proprioception: normal  Left leg proprioception: normal  Right arm pinprick: decreased from fingers  Left arm pinprick: decreased from fingers  Right leg pinprick: normal  Left leg pinprick: normal  Graphesthesia: normal  Romberg: negative  Stereognosis: normal    GAIT AND COORDINATION     Gait  Gait: wide-based     Coordination   Finger to nose coordination: normal  Heel to shin coordination: normal  Tandem walking coordination: abnormal    Tremor   Resting tremor: absent  Intention tremor: absent  Action tremor: absent       Physical Exam  Vitals and nursing note reviewed.   Constitutional:       Appearance: Normal appearance.   HENT:      Head: Normocephalic.   Eyes:      Extraocular Movements: Extraocular movements intact and EOM normal.      Pupils: Pupils are equal, round, and reactive to light.   Cardiovascular:      Rate and Rhythm: Normal rate and regular rhythm.   Pulmonary:      Effort: Pulmonary effort is normal.      Breath sounds: Normal breath sounds.   Musculoskeletal:         General: No swelling or tenderness. Normal range of motion.      Cervical back: Normal range of motion and neck supple.      Right lower leg: No edema.      Left lower leg: No edema.   Skin:     General: Skin is warm and dry.      Coloration: Skin is not jaundiced.      Findings: No rash.   Neurological:      General: No focal deficit present.      Mental Status: She is alert and oriented to person, place, and time. Mental status is at baseline.      GCS: GCS eye subscore is 4. GCS verbal subscore is 5. GCS motor subscore is 6.      Cranial Nerves:  No cranial nerve deficit.      Sensory: Sensory deficit present.      Motor: Weakness present.      Coordination: Coordination is intact. Coordination normal. Finger-Nose-Finger Test, Heel to Shin Test and Romberg Test normal.      Gait: Tandem walk abnormal. Gait normal.      Deep Tendon Reflexes: Reflexes normal.      Reflex Scores:       Tricep reflexes are 2+ on the right side and 2+ on the left side.       Bicep reflexes are 2+ on the right side and 2+ on the left side.       Brachioradialis reflexes are 2+ on the right side and 2+ on the left side.       Patellar reflexes are 2+ on the right side and 2+ on the left side.       Achilles reflexes are 2+ on the right side and 2+ on the left side.  Psychiatric:         Mood and Affect: Mood normal.         Speech: Speech normal.         Behavior: Behavior normal.          Assessment:     Problem List Items Addressed This Visit          Neuro    Cerebrovascular accident (CVA) - Primary    Diabetic polyneuropathy associated with type 1 diabetes mellitus    Relevant Medications    gabapentin (NEURONTIN) 600 MG tablet    MCI (mild cognitive impairment)    Combined receptive and expressive aphasia as late effect of cerebrovascular accident (CVA)       Psychiatric    Depression as late effect of cerebrovascular accident (CVA)    Relevant Medications    citalopram (CELEXA) 10 MG tablet                    Primary Diagnosis and ICD10  Cerebrovascular accident (CVA), unspecified mechanism [I63.9]    Plan:     Patient Instructions     Continue current celexa  Continue the plavix 75mg daily  Continue the lipitor and blood pressure medication  Encouraged good eating and sleep habits  Good glycemic control  Continue the neurontin 900mg Qid    Stroke risk modifiers:    1. Good sleep habits, recommend at least 7 hours total sleep daily  2. Continue management of blood pressure and cholesterol including medications, exercise, and good eating habits  3. Exercise as much as  possible, recommend 5 days of the week for 30 minutes each day  4. Avoid smoking and alcohol  5. Go to the nearest emergency department immediately if any new or worsening weakness, speech difficulty, or numbness     Medications Discontinued During This Encounter   Medication Reason    atorvastatin (LIPITOR) 40 MG tablet     furosemide (LASIX) 20 MG tablet     traMADoL (ULTRAM) 50 mg tablet     gabapentin (NEURONTIN) 600 MG tablet Reorder    citalopram (CELEXA) 10 MG tablet                Requested Prescriptions     Signed Prescriptions Disp Refills    gabapentin (NEURONTIN) 600 MG tablet 180 tablet 5     Sig: Take 1.5 tablets (900 mg total) by mouth 3 (three) times daily.    citalopram (CELEXA) 10 MG tablet 60 tablet 5     Sig: Take 2 tablets (20 mg total) by mouth once daily.       No orders of the defined types were placed in this encounter.

## 2025-01-02 NOTE — PATIENT INSTRUCTIONS
Continue current celexa  Continue the plavix 75mg daily  Continue the lipitor and blood pressure medication  Encouraged good eating and sleep habits  Good glycemic control  Continue the neurontin 900mg Qid    Stroke risk modifiers:    1. Good sleep habits, recommend at least 7 hours total sleep daily  2. Continue management of blood pressure and cholesterol including medications, exercise, and good eating habits  3. Exercise as much as possible, recommend 5 days of the week for 30 minutes each day  4. Avoid smoking and alcohol  5. Go to the nearest emergency department immediately if any new or worsening weakness, speech difficulty, or numbness

## 2025-07-24 DIAGNOSIS — F06.31 DEPRESSION AS LATE EFFECT OF CEREBROVASCULAR ACCIDENT (CVA): ICD-10-CM

## 2025-07-24 DIAGNOSIS — I69.398 DEPRESSION AS LATE EFFECT OF CEREBROVASCULAR ACCIDENT (CVA): ICD-10-CM

## 2025-07-24 RX ORDER — CITALOPRAM 10 MG/1
20 TABLET ORAL DAILY
Qty: 60 TABLET | Refills: 5 | Status: SHIPPED | OUTPATIENT
Start: 2025-07-24 | End: 2026-07-24

## 2025-08-25 ENCOUNTER — TELEPHONE (OUTPATIENT)
Dept: NEUROLOGY | Facility: CLINIC | Age: 65
End: 2025-08-25
Payer: COMMERCIAL

## 2025-08-26 ENCOUNTER — TELEPHONE (OUTPATIENT)
Dept: NEUROLOGY | Facility: CLINIC | Age: 65
End: 2025-08-26
Payer: COMMERCIAL

## 2025-08-28 ENCOUNTER — OFFICE VISIT (OUTPATIENT)
Dept: NEUROLOGY | Facility: CLINIC | Age: 65
End: 2025-08-28
Payer: COMMERCIAL

## 2025-08-28 VITALS
BODY MASS INDEX: 22.68 KG/M2 | OXYGEN SATURATION: 99 % | HEIGHT: 63 IN | WEIGHT: 128 LBS | HEART RATE: 67 BPM | SYSTOLIC BLOOD PRESSURE: 172 MMHG | DIASTOLIC BLOOD PRESSURE: 76 MMHG

## 2025-08-28 DIAGNOSIS — E10.42 DIABETIC POLYNEUROPATHY ASSOCIATED WITH TYPE 1 DIABETES MELLITUS: ICD-10-CM

## 2025-08-28 DIAGNOSIS — I65.29 OCCLUSION AND STENOSIS OF UNSPECIFIED CAROTID ARTERY: ICD-10-CM

## 2025-08-28 DIAGNOSIS — G31.84 MCI (MILD COGNITIVE IMPAIRMENT): ICD-10-CM

## 2025-08-28 DIAGNOSIS — I65.22 CAROTID ARTERY STENOSIS WITHOUT CEREBRAL INFARCTION, LEFT: ICD-10-CM

## 2025-08-28 DIAGNOSIS — I63.9 CEREBROVASCULAR ACCIDENT (CVA), UNSPECIFIED MECHANISM: Primary | ICD-10-CM

## 2025-08-28 PROCEDURE — 99999 PR PBB SHADOW E&M-EST. PATIENT-LVL V: CPT | Mod: PBBFAC,,, | Performed by: NURSE PRACTITIONER

## 2025-08-28 RX ORDER — SEMAGLUTIDE 0.68 MG/ML
0.5 INJECTION, SOLUTION SUBCUTANEOUS
COMMUNITY
Start: 2025-06-23

## 2025-08-28 RX ORDER — CLOTRIMAZOLE AND BETAMETHASONE DIPROPIONATE 10; .64 MG/G; MG/G
CREAM TOPICAL
COMMUNITY
Start: 2025-07-24

## 2025-08-28 RX ORDER — DAPAGLIFLOZIN 10 MG/1
10 TABLET, FILM COATED ORAL
COMMUNITY
Start: 2025-06-23

## 2025-08-28 RX ORDER — CLOPIDOGREL BISULFATE 75 MG/1
75 TABLET ORAL DAILY
Qty: 30 TABLET | Refills: 11 | Status: SHIPPED | OUTPATIENT
Start: 2025-08-28